# Patient Record
Sex: FEMALE | Race: BLACK OR AFRICAN AMERICAN | NOT HISPANIC OR LATINO | Employment: FULL TIME | URBAN - METROPOLITAN AREA
[De-identification: names, ages, dates, MRNs, and addresses within clinical notes are randomized per-mention and may not be internally consistent; named-entity substitution may affect disease eponyms.]

---

## 2017-11-15 ENCOUNTER — OFFICE VISIT (OUTPATIENT)
Dept: OBSTETRICS AND GYNECOLOGY | Facility: CLINIC | Age: 52
End: 2017-11-15
Payer: COMMERCIAL

## 2017-11-15 VITALS
BODY MASS INDEX: 29.55 KG/M2 | SYSTOLIC BLOOD PRESSURE: 116 MMHG | DIASTOLIC BLOOD PRESSURE: 82 MMHG | HEIGHT: 61 IN | WEIGHT: 156.5 LBS

## 2017-11-15 DIAGNOSIS — Z01.419 WELL WOMAN EXAM WITH ROUTINE GYNECOLOGICAL EXAM: Primary | ICD-10-CM

## 2017-11-15 DIAGNOSIS — Z12.39 BREAST CANCER SCREENING: ICD-10-CM

## 2017-11-15 DIAGNOSIS — N89.8 VAGINAL DISCHARGE: ICD-10-CM

## 2017-11-15 PROCEDURE — 99999 PR PBB SHADOW E&M-EST. PATIENT-LVL III: CPT | Mod: PBBFAC,,, | Performed by: OBSTETRICS & GYNECOLOGY

## 2017-11-15 PROCEDURE — 99396 PREV VISIT EST AGE 40-64: CPT | Mod: S$GLB,,, | Performed by: OBSTETRICS & GYNECOLOGY

## 2017-11-15 RX ORDER — FLUCONAZOLE 150 MG/1
150 TABLET ORAL
Qty: 3 TABLET | Refills: 1 | Status: SHIPPED | OUTPATIENT
Start: 2017-11-15 | End: 2018-11-02 | Stop reason: ALTCHOICE

## 2017-11-15 RX ORDER — METRONIDAZOLE 7.5 MG/G
1 GEL VAGINAL NIGHTLY
Qty: 5 APPLICATOR | Refills: 3 | Status: SHIPPED | OUTPATIENT
Start: 2017-11-15 | End: 2017-11-20

## 2017-11-15 NOTE — PROGRESS NOTES
"[facility]  Ambulatory Clinic  Obstetrics & Gynecology    Visit Date:  11/15/2017    Chief Complaint:  Annual GYN exam    History of Present Illness:      Cristina Fragoso is a 52 y.o.  here for a gynecologic exam with c/o vaginal discharge. Pt described discharge as clear, fishy, non bloody, without pelvic pain or abnormal vaginal bleeding for past few days. Menses are regular, not heavy or painful.  Pt current method of family planning is rhythm method/condoms, and reports no problems with this method.  Pt denies h/o abnormal pap, last pap ~. Pt has genital herpes, rare outbreaks. Pt denies h/o abnormal mammogram, last mammo ~2016.  Pt performs monthly self breast examination, former smoker, uses seat belts, and denies abuse. Pt denies any abnormal vaginal bleeding, dysmenorrhea, dyspareunia, pelvic pain, bloating, early satiety, unintentional weight loss, breast mass/skin changes, incontinence, GI or urinary complaints.      Past History:  Gynecologic history as noted above.    Review of Systems:      GENERAL:  No fever, fatigue, excessive weight gain or loss  HEENT:  No headaches, hearing changes, visual disturbance  RESPIRATORY:  No cough, shortness of breath  CARDIOVASCULAR:  No chest pain, heart palpitations, leg swelling  BREAST:  No lump, pain, nipple discharge, skin changes  GASTROINTESTINAL:  No nausea, vomiting, constipation, diarrhea, abd pain, rectal bleeding   GENITOURINARY:  See HPI  ENDOCRINE:  No heat or cold intolerance  HEMATOLOGIC:  No easy bruisability or bleeding   LYMPHATICS:  No enlarged nodes  MUSCULOSKELETAL:  No joint pain or swelling  SKIN:  No rash, lesions, jaundice  NEUROLOGIC:  No dizziness, weakness, syncope  PSYCHIATRIC:  Denies homicidal/suicidal ideations, anxiety or mood swings.    Physical Exam:     /82 (BP Location: Right arm, Patient Position: Sitting, BP Method: Large (Manual))   Ht 5' 1" (1.549 m)   Wt 71 kg (156 lb 8.4 oz)   " LMP 2017 (Approximate)   BMI 29.58 kg/m²      GENERAL:  No acute distress, well-nourished  HEENT:  Atraumatic, anicteric, moist mucus membranes, neck supple w/o masses  BREAST:  Symmetric, nontender, no obvious masses, adenopathy, skin changes or nipple discharge  LUNGS:  Clear to auscultation  HEART:  Regular rate and rhythm, no murmurs, gallops, or rubs  ABDOMEN:  Soft, non-tender, non-distended, normoactive bowel sounds, no obvious organomegaly  EXT:  Symmetric w/o cramping, claudication, or edema. +2 distal pulses  SKIN:  No rashes or bruising  PSYCH:  Mood and affect appropriate    GENITOURINARY:  NFEG no lesion. No vaginal or cervical lesion. No bleeding or discharge. No CMT. Uterus and ovaries small, NT. Wet prep negative. Declined rectal exam. No obvious external lesions.    Chaperone present for exam.    Assessment:     52 y.o. :    1. Well woman gynecologic exam  2. Bacterial vaginosis    Plan:    A gynecologic health assessment was performed with age appropriate counseling.  Cervical cancer screening - up to date.  STI screening - pt declined.  Safe sex discussed.    Order screening mammogram, pt advised to call and schedule.  Metrogel for bacterial vaginosis.  No alcohol while on antibx.  Hygiene advice.  Pt also requesting an Rx for diflucan for future use for yeast infection.   Encourage healthy lifestyle modifications, monthly self breast exams, Ca/Vit D.  F/u with PCP for health maintenance.  Return 1 year for GYN exam, or sooner as needed.    All questions answered, pt voiced understanding.        Edin Henson MD

## 2017-12-27 DIAGNOSIS — B37.31 VAGINAL YEAST INFECTION: ICD-10-CM

## 2017-12-27 RX ORDER — TERCONAZOLE 4 MG/G
CREAM VAGINAL
Qty: 45 G | Refills: 0 | Status: SHIPPED | OUTPATIENT
Start: 2017-12-27 | End: 2018-05-08 | Stop reason: SDUPTHER

## 2018-05-08 DIAGNOSIS — B37.31 VAGINAL YEAST INFECTION: ICD-10-CM

## 2018-05-08 RX ORDER — TERCONAZOLE 4 MG/G
CREAM VAGINAL
Qty: 45 G | Refills: 0 | Status: SHIPPED | OUTPATIENT
Start: 2018-05-08 | End: 2018-11-02 | Stop reason: ALTCHOICE

## 2018-11-02 ENCOUNTER — OFFICE VISIT (OUTPATIENT)
Dept: OBSTETRICS AND GYNECOLOGY | Facility: CLINIC | Age: 53
End: 2018-11-02
Payer: COMMERCIAL

## 2018-11-02 VITALS
BODY MASS INDEX: 29.45 KG/M2 | SYSTOLIC BLOOD PRESSURE: 120 MMHG | HEIGHT: 61 IN | WEIGHT: 156 LBS | DIASTOLIC BLOOD PRESSURE: 76 MMHG

## 2018-11-02 DIAGNOSIS — Z12.39 BREAST CANCER SCREENING: ICD-10-CM

## 2018-11-02 DIAGNOSIS — Z01.419 WELL WOMAN EXAM WITH ROUTINE GYNECOLOGICAL EXAM: Primary | ICD-10-CM

## 2018-11-02 PROCEDURE — 99999 PR PBB SHADOW E&M-EST. PATIENT-LVL III: CPT | Mod: PBBFAC,,, | Performed by: OBSTETRICS & GYNECOLOGY

## 2018-11-02 PROCEDURE — 99396 PREV VISIT EST AGE 40-64: CPT | Mod: S$GLB,,, | Performed by: OBSTETRICS & GYNECOLOGY

## 2018-11-02 NOTE — PROGRESS NOTES
[facility]  Ambulatory Clinic  Obstetrics & Gynecology    Visit Date:  2018    Chief Complaint:  Annual GYN exam    History of Present Illness:      Cristina Fragoso is a 53 y.o.  here for a gynecologic exam with c/o vaginal discharge.   Pt has no major complaints today.  Patient's last menstrual period was 2018.  Pt is perimenopausal, menses are spacing out, getting lighter with mild vasomotor sxs.  Pt current method of family planning is rhythm method/condoms, and reports no problems with this method.    Pt denies h/o abnormal pap, last pap ~.   Pt has genital herpes, rare outbreaks.   Pt denies h/o abnormal mammogram, last mammo ~2016.  Pt states she has not had colonoscopy.  Pt performs monthly self breast examination, former smoker, uses seat belts, and denies abuse.   Pt denies any abnormal vaginal bleeding, dysmenorrhea, dyspareunia, pelvic pain, bloating, early satiety, unintentional weight loss, breast mass/skin changes, incontinence, GI or urinary complaints.    Pt states she in her usual state of health and has good f/u with her PCP.    Past History:  Gynecologic history as noted above.    Review of Systems:      GENERAL:  No fever, fatigue, excessive weight gain or loss  HEENT:  No headaches, hearing changes, visual disturbance  RESPIRATORY:  No cough, shortness of breath  CARDIOVASCULAR:  No chest pain, heart palpitations, leg swelling  BREAST:  No lump, pain, nipple discharge, skin changes  GASTROINTESTINAL:  No nausea, vomiting, constipation, diarrhea, abd pain, rectal bleeding   GENITOURINARY:  See HPI  ENDOCRINE:  No heat or cold intolerance  HEMATOLOGIC:  No easy bruisability or bleeding   LYMPHATICS:  No enlarged nodes  MUSCULOSKELETAL:  No joint pain or swelling  SKIN:  No rash, lesions, jaundice  NEUROLOGIC:  No dizziness, weakness, syncope  PSYCHIATRIC:  Denies homicidal/suicidal ideations, anxiety or mood swings.    Physical Exam:     /76  "(BP Location: Right arm, Patient Position: Sitting, BP Method: Large (Manual))   Ht 5' 1" (1.549 m)   Wt 70.8 kg (155 lb 15.6 oz)   BMI 29.47 kg/m²      GENERAL:  No acute distress, well-nourished  HEENT:  Atraumatic, anicteric, moist mucus membranes, neck supple w/o masses  BREAST:  Symmetric, nontender, no obvious masses, adenopathy, skin changes or nipple discharge  LUNGS:  Clear to auscultation  HEART:  Regular rate and rhythm, no murmurs, gallops, or rubs  ABDOMEN:  Soft, non-tender, non-distended, normoactive bowel sounds, no obvious organomegaly  EXT:  Symmetric w/o cramping, claudication, or edema. +2 distal pulses  SKIN:  No rashes or bruising  PSYCH:  Mood and affect appropriate  GENITOURINARY:  NFEG no lesion. No vaginal or cervical lesion. No bleeding or discharge. No CMT. Uterus and ovaries small, NT. Declined rectal exam. No obvious external lesions.    Chaperone present for exam.    Assessment:     53 y.o. :    1. Well woman gynecologic exam  2. Perimenopause    Plan:    A gynecologic health assessment was performed with age appropriate counseling.    Cervical cancer screening - up to date.     Order screening mammogram, pt advised to call and schedule.    We discussed the physiologic changes associated with menopause including various treatment options.  We discussed non-estrogen, alternative therapies, and lifestyle modifications for menopausal symptoms.      Encourage healthy lifestyle modifications, monthly self breast exams, Ca/Vit D.    F/u with PCP for health maintenance.  Pt encourage to get colonoscopy, advised to call her PCP for referral.    Return 1 year for GYN exam, or sooner as needed.  Pt voiced understanding.        Edin Henson MD            "

## 2018-12-03 ENCOUNTER — HOSPITAL ENCOUNTER (OUTPATIENT)
Dept: RADIOLOGY | Facility: OTHER | Age: 53
Discharge: HOME | End: 2018-12-03
Attending: OBSTETRICS & GYNECOLOGY
Payer: COMMERCIAL

## 2018-12-03 DIAGNOSIS — Z12.39 BREAST CANCER SCREENING: ICD-10-CM

## 2018-12-03 PROCEDURE — 77063 BREAST TOMOSYNTHESIS BI: CPT | Mod: TC

## 2018-12-03 PROCEDURE — 77067 SCR MAMMO BI INCL CAD: CPT | Mod: 26,,, | Performed by: RADIOLOGY

## 2018-12-03 PROCEDURE — 77063 BREAST TOMOSYNTHESIS BI: CPT | Mod: 26,,, | Performed by: RADIOLOGY

## 2020-04-21 DIAGNOSIS — Z01.84 ANTIBODY RESPONSE EXAMINATION: ICD-10-CM

## 2020-05-21 DIAGNOSIS — Z01.84 ANTIBODY RESPONSE EXAMINATION: ICD-10-CM

## 2020-06-20 DIAGNOSIS — Z01.84 ANTIBODY RESPONSE EXAMINATION: ICD-10-CM

## 2020-07-20 DIAGNOSIS — Z01.84 ANTIBODY RESPONSE EXAMINATION: ICD-10-CM

## 2020-08-19 DIAGNOSIS — Z01.84 ANTIBODY RESPONSE EXAMINATION: ICD-10-CM

## 2020-09-03 ENCOUNTER — HOSPITAL ENCOUNTER (OUTPATIENT)
Dept: RADIOLOGY | Facility: HOSPITAL | Age: 55
Discharge: HOME | End: 2020-09-03
Attending: NURSE PRACTITIONER
Payer: COMMERCIAL

## 2020-09-03 DIAGNOSIS — Z12.31 BREAST CANCER SCREENING BY MAMMOGRAM: ICD-10-CM

## 2020-09-03 PROCEDURE — 77063 MAMMO DIGITAL SCREENING BILAT WITH TOMOSYNTHESIS_CAD: ICD-10-PCS | Mod: 26,,, | Performed by: RADIOLOGY

## 2020-09-03 PROCEDURE — 77063 BREAST TOMOSYNTHESIS BI: CPT | Mod: 26,,, | Performed by: RADIOLOGY

## 2020-09-03 PROCEDURE — 77067 SCR MAMMO BI INCL CAD: CPT | Mod: TC

## 2020-09-03 PROCEDURE — 77067 MAMMO DIGITAL SCREENING BILAT WITH TOMOSYNTHESIS_CAD: ICD-10-PCS | Mod: 26,,, | Performed by: RADIOLOGY

## 2020-09-03 PROCEDURE — 77067 SCR MAMMO BI INCL CAD: CPT | Mod: 26,,, | Performed by: RADIOLOGY

## 2020-09-18 DIAGNOSIS — Z01.84 ANTIBODY RESPONSE EXAMINATION: ICD-10-CM

## 2020-10-18 DIAGNOSIS — Z01.84 ANTIBODY RESPONSE EXAMINATION: ICD-10-CM

## 2020-11-17 DIAGNOSIS — Z01.84 ANTIBODY RESPONSE EXAMINATION: ICD-10-CM

## 2021-04-16 ENCOUNTER — PATIENT MESSAGE (OUTPATIENT)
Dept: RESEARCH | Facility: HOSPITAL | Age: 56
End: 2021-04-16

## 2021-05-28 ENCOUNTER — OFFICE VISIT (OUTPATIENT)
Dept: OBSTETRICS AND GYNECOLOGY | Facility: CLINIC | Age: 56
End: 2021-05-28
Payer: COMMERCIAL

## 2021-05-28 VITALS
WEIGHT: 156.94 LBS | DIASTOLIC BLOOD PRESSURE: 84 MMHG | BODY MASS INDEX: 29.63 KG/M2 | HEIGHT: 61 IN | SYSTOLIC BLOOD PRESSURE: 122 MMHG

## 2021-05-28 DIAGNOSIS — Z01.419 WELL WOMAN EXAM WITH ROUTINE GYNECOLOGICAL EXAM: Primary | ICD-10-CM

## 2021-05-28 DIAGNOSIS — Z12.4 CERVICAL CANCER SCREENING: ICD-10-CM

## 2021-05-28 PROCEDURE — 99999 PR PBB SHADOW E&M-EST. PATIENT-LVL III: ICD-10-PCS | Mod: PBBFAC,,, | Performed by: OBSTETRICS & GYNECOLOGY

## 2021-05-28 PROCEDURE — 1126F PR PAIN SEVERITY QUANTIFIED, NO PAIN PRESENT: ICD-10-PCS | Mod: S$GLB,,, | Performed by: OBSTETRICS & GYNECOLOGY

## 2021-05-28 PROCEDURE — 99396 PREV VISIT EST AGE 40-64: CPT | Mod: S$GLB,,, | Performed by: OBSTETRICS & GYNECOLOGY

## 2021-05-28 PROCEDURE — 3008F BODY MASS INDEX DOCD: CPT | Mod: CPTII,S$GLB,, | Performed by: OBSTETRICS & GYNECOLOGY

## 2021-05-28 PROCEDURE — 1126F AMNT PAIN NOTED NONE PRSNT: CPT | Mod: S$GLB,,, | Performed by: OBSTETRICS & GYNECOLOGY

## 2021-05-28 PROCEDURE — 99396 PR PREVENTIVE VISIT,EST,40-64: ICD-10-PCS | Mod: S$GLB,,, | Performed by: OBSTETRICS & GYNECOLOGY

## 2021-05-28 PROCEDURE — 3008F PR BODY MASS INDEX (BMI) DOCUMENTED: ICD-10-PCS | Mod: CPTII,S$GLB,, | Performed by: OBSTETRICS & GYNECOLOGY

## 2021-05-28 PROCEDURE — 88175 CYTOPATH C/V AUTO FLUID REDO: CPT | Performed by: OBSTETRICS & GYNECOLOGY

## 2021-05-28 PROCEDURE — 87624 HPV HI-RISK TYP POOLED RSLT: CPT | Performed by: OBSTETRICS & GYNECOLOGY

## 2021-05-28 PROCEDURE — 99999 PR PBB SHADOW E&M-EST. PATIENT-LVL III: CPT | Mod: PBBFAC,,, | Performed by: OBSTETRICS & GYNECOLOGY

## 2021-05-28 RX ORDER — ROSUVASTATIN CALCIUM 10 MG/1
10 TABLET, COATED ORAL DAILY
COMMUNITY
Start: 2021-04-15

## 2021-06-02 LAB
HPV HR 12 DNA SPEC QL NAA+PROBE: NEGATIVE
HPV16 AG SPEC QL: NEGATIVE
HPV18 DNA SPEC QL NAA+PROBE: NEGATIVE

## 2021-06-08 LAB
FINAL PATHOLOGIC DIAGNOSIS: NORMAL
Lab: NORMAL

## 2021-07-01 ENCOUNTER — PATIENT MESSAGE (OUTPATIENT)
Dept: ADMINISTRATIVE | Facility: OTHER | Age: 56
End: 2021-07-01

## 2021-10-23 ENCOUNTER — HOSPITAL ENCOUNTER (OUTPATIENT)
Dept: RADIOLOGY | Facility: OTHER | Age: 56
Discharge: HOME | End: 2021-10-23
Attending: NURSE PRACTITIONER
Payer: COMMERCIAL

## 2021-10-23 DIAGNOSIS — R74.8 ELEVATED LIVER ENZYMES: ICD-10-CM

## 2021-10-23 DIAGNOSIS — R10.11 RUQ ABDOMINAL PAIN: ICD-10-CM

## 2021-10-23 PROCEDURE — 76700 US EXAM ABDOM COMPLETE: CPT | Mod: TC

## 2021-10-23 PROCEDURE — 76700 US EXAM ABDOM COMPLETE: CPT | Mod: 26,,, | Performed by: RADIOLOGY

## 2021-10-23 PROCEDURE — 76700 US ABDOMEN COMPLETE: ICD-10-PCS | Mod: 26,,, | Performed by: RADIOLOGY

## 2021-11-04 ENCOUNTER — LAB VISIT (OUTPATIENT)
Dept: LAB | Facility: OTHER | Age: 56
End: 2021-11-04
Attending: INTERNAL MEDICINE
Payer: COMMERCIAL

## 2021-11-04 DIAGNOSIS — R79.89 ABNORMAL LIVER FUNCTION TESTS: ICD-10-CM

## 2021-11-04 DIAGNOSIS — K83.8 COMMON BILE DUCT DILATATION: ICD-10-CM

## 2021-11-04 DIAGNOSIS — Z12.11 SPECIAL SCREENING FOR MALIGNANT NEOPLASMS, COLON: Primary | ICD-10-CM

## 2021-11-04 DIAGNOSIS — R10.11 ABDOMINAL PAIN, RIGHT UPPER QUADRANT: ICD-10-CM

## 2021-11-04 LAB
FERRITIN SERPL-MCNC: 152 NG/ML (ref 20–300)
IRON SERPL-MCNC: 83 UG/DL (ref 30–160)
SATURATED IRON: 22 % (ref 20–50)
TOTAL IRON BINDING CAPACITY: 382 UG/DL (ref 250–450)
TRANSFERRIN SERPL-MCNC: 258 MG/DL (ref 200–375)

## 2021-11-04 PROCEDURE — 86039 ANTINUCLEAR ANTIBODIES (ANA): CPT | Performed by: INTERNAL MEDICINE

## 2021-11-04 PROCEDURE — 82728 ASSAY OF FERRITIN: CPT | Performed by: INTERNAL MEDICINE

## 2021-11-04 PROCEDURE — 86235 NUCLEAR ANTIGEN ANTIBODY: CPT | Mod: 59 | Performed by: INTERNAL MEDICINE

## 2021-11-04 PROCEDURE — 86038 ANTINUCLEAR ANTIBODIES: CPT | Performed by: INTERNAL MEDICINE

## 2021-11-04 PROCEDURE — 36415 COLL VENOUS BLD VENIPUNCTURE: CPT | Performed by: INTERNAL MEDICINE

## 2021-11-04 PROCEDURE — 80074 ACUTE HEPATITIS PANEL: CPT | Performed by: INTERNAL MEDICINE

## 2021-11-04 PROCEDURE — 86256 FLUORESCENT ANTIBODY TITER: CPT | Performed by: INTERNAL MEDICINE

## 2021-11-04 PROCEDURE — 84466 ASSAY OF TRANSFERRIN: CPT | Performed by: INTERNAL MEDICINE

## 2021-11-05 LAB
ANA PATTERN 1: NORMAL
ANA SER QL IF: POSITIVE
ANA TITR SER IF: NORMAL {TITER}
HAV IGM SERPL QL IA: NEGATIVE
HBV CORE IGM SERPL QL IA: NEGATIVE
HBV SURFACE AG SERPL QL IA: NEGATIVE
HCV AB SERPL QL IA: NEGATIVE

## 2021-11-10 LAB — SMOOTH MUSCLE AB TITR SER IF: ABNORMAL {TITER}

## 2021-11-11 LAB
ANTI SM ANTIBODY: 0.06 RATIO (ref 0–0.99)
ANTI SM/RNP ANTIBODY: 0.06 RATIO (ref 0–0.99)
ANTI-SM INTERPRETATION: NEGATIVE
ANTI-SM/RNP INTERPRETATION: NEGATIVE
ANTI-SSA ANTIBODY: 0.06 RATIO (ref 0–0.99)
ANTI-SSA INTERPRETATION: NEGATIVE
ANTI-SSB ANTIBODY: 0.06 RATIO (ref 0–0.99)
ANTI-SSB INTERPRETATION: NEGATIVE
DSDNA AB SER-ACNC: NORMAL [IU]/ML

## 2022-06-07 ENCOUNTER — HOSPITAL ENCOUNTER (OUTPATIENT)
Dept: RADIOLOGY | Facility: OTHER | Age: 57
Discharge: HOME | End: 2022-06-07
Attending: NURSE PRACTITIONER
Payer: COMMERCIAL

## 2022-06-07 DIAGNOSIS — Z12.31 ENCOUNTER FOR SCREENING MAMMOGRAM FOR MALIGNANT NEOPLASM OF BREAST: ICD-10-CM

## 2022-06-07 PROCEDURE — 77067 SCR MAMMO BI INCL CAD: CPT | Mod: TC

## 2022-06-07 PROCEDURE — 77063 BREAST TOMOSYNTHESIS BI: CPT | Mod: 26,,, | Performed by: RADIOLOGY

## 2022-06-07 PROCEDURE — 77067 MAMMO DIGITAL SCREENING BILAT WITH TOMO: ICD-10-PCS | Mod: 26,,, | Performed by: RADIOLOGY

## 2022-06-07 PROCEDURE — 77067 SCR MAMMO BI INCL CAD: CPT | Mod: 26,,, | Performed by: RADIOLOGY

## 2022-06-07 PROCEDURE — 77063 MAMMO DIGITAL SCREENING BILAT WITH TOMO: ICD-10-PCS | Mod: 26,,, | Performed by: RADIOLOGY

## 2022-06-07 PROCEDURE — 77063 BREAST TOMOSYNTHESIS BI: CPT | Mod: TC

## 2022-11-02 ENCOUNTER — OFFICE VISIT (OUTPATIENT)
Dept: OBSTETRICS AND GYNECOLOGY | Facility: CLINIC | Age: 57
End: 2022-11-02
Payer: COMMERCIAL

## 2022-11-02 VITALS
DIASTOLIC BLOOD PRESSURE: 70 MMHG | HEIGHT: 61 IN | SYSTOLIC BLOOD PRESSURE: 110 MMHG | BODY MASS INDEX: 30.69 KG/M2 | WEIGHT: 162.56 LBS

## 2022-11-02 DIAGNOSIS — Z01.419 WELL WOMAN EXAM WITH ROUTINE GYNECOLOGICAL EXAM: Primary | ICD-10-CM

## 2022-11-02 PROCEDURE — 3078F DIAST BP <80 MM HG: CPT | Mod: CPTII,S$GLB,, | Performed by: OBSTETRICS & GYNECOLOGY

## 2022-11-02 PROCEDURE — 3074F PR MOST RECENT SYSTOLIC BLOOD PRESSURE < 130 MM HG: ICD-10-PCS | Mod: CPTII,S$GLB,, | Performed by: OBSTETRICS & GYNECOLOGY

## 2022-11-02 PROCEDURE — 99396 PREV VISIT EST AGE 40-64: CPT | Mod: S$GLB,,, | Performed by: OBSTETRICS & GYNECOLOGY

## 2022-11-02 PROCEDURE — 3074F SYST BP LT 130 MM HG: CPT | Mod: CPTII,S$GLB,, | Performed by: OBSTETRICS & GYNECOLOGY

## 2022-11-02 PROCEDURE — 99396 PR PREVENTIVE VISIT,EST,40-64: ICD-10-PCS | Mod: S$GLB,,, | Performed by: OBSTETRICS & GYNECOLOGY

## 2022-11-02 PROCEDURE — 99999 PR PBB SHADOW E&M-EST. PATIENT-LVL III: CPT | Mod: PBBFAC,,, | Performed by: OBSTETRICS & GYNECOLOGY

## 2022-11-02 PROCEDURE — 1159F PR MEDICATION LIST DOCUMENTED IN MEDICAL RECORD: ICD-10-PCS | Mod: CPTII,S$GLB,, | Performed by: OBSTETRICS & GYNECOLOGY

## 2022-11-02 PROCEDURE — 3078F PR MOST RECENT DIASTOLIC BLOOD PRESSURE < 80 MM HG: ICD-10-PCS | Mod: CPTII,S$GLB,, | Performed by: OBSTETRICS & GYNECOLOGY

## 2022-11-02 PROCEDURE — 1159F MED LIST DOCD IN RCRD: CPT | Mod: CPTII,S$GLB,, | Performed by: OBSTETRICS & GYNECOLOGY

## 2022-11-02 PROCEDURE — 99999 PR PBB SHADOW E&M-EST. PATIENT-LVL III: ICD-10-PCS | Mod: PBBFAC,,, | Performed by: OBSTETRICS & GYNECOLOGY

## 2022-11-02 PROCEDURE — 3008F PR BODY MASS INDEX (BMI) DOCUMENTED: ICD-10-PCS | Mod: CPTII,S$GLB,, | Performed by: OBSTETRICS & GYNECOLOGY

## 2022-11-02 PROCEDURE — 3008F BODY MASS INDEX DOCD: CPT | Mod: CPTII,S$GLB,, | Performed by: OBSTETRICS & GYNECOLOGY

## 2022-11-02 NOTE — PROGRESS NOTES
"[facility]  Ambulatory Clinic  Obstetrics & Gynecology    Visit Date:  2022    Chief Complaint:  Annual GYN exam    History of Present Illness:      Cristina Fragoso is a 57 y.o.  here for a gynecologic exam.    Pt has no major complaints today.    Pt reports an uneventful transition into menopause and is not on hormone replacement therapy.    Last pap ~2021 benign.  Last mammo ~2022 years ago per pt.  Pt performs monthly self breast examination, non-smoker, uses seat belts, and denies abuse.   Pt denies vaginal bleeding, vaginal discharge, dyspareunia, pelvic pain, bloating, early satiety, unintentional weight loss, breast mass/skin changes, incontinence, GI or urinary complaints.    Otherwise, the pt is in her usual state of health.    Past History:  Gynecologic history as noted above.    Review of Systems:      GENERAL:  No fever, fatigue, excessive weight gain or loss  HEENT:  No headaches, hearing changes, visual disturbance  RESPIRATORY:  No cough, shortness of breath  CARDIOVASCULAR:  No chest pain, heart palpitations, leg swelling  BREAST:  No lump, pain, nipple discharge, skin changes  GASTROINTESTINAL:  No nausea, vomiting, constipation, diarrhea, abd pain, rectal bleeding   GENITOURINARY:  See HPI  ENDOCRINE:  No heat or cold intolerance  HEMATOLOGIC:  No easy bruisability or bleeding   LYMPHATICS:  No enlarged nodes  MUSCULOSKELETAL:  No acute joint pain or swelling  SKIN:  No rash, lesions, jaundice  NEUROLOGIC:  No dizziness, weakness, syncope  PSYCHIATRIC:  No significant mood changes, homicidal/suicidal ideations    Physical Exam:     /70   Ht 5' 1" (1.549 m)   Wt 73.8 kg (162 lb 9.4 oz)   BMI 30.72 kg/m²   Pulse 60's, Resp rate 16     GENERAL:  No acute distress, well-nourished  HEENT:  Atraumatic, anicteric, moist mucus membranes. Neck supple w/o masses.  BREAST:  Symmetric, nontender, no obvious masses, adenopathy, skin changes or nipple " discharge.  LUNGS:  Clear normal respiratory effort  HEART:  Regular rate and rhythm, no murmurs, gallops, or rubs  ABDOMEN:  Soft, non-tender, non-distended, normoactive bowel sounds, no obvious organomegaly  EXT:  Symmetric w/o cramping, claudication, or edema. +2 distal pulses.  SKIN:  No rashes or bruising  PSYCH:  Mood and affect appropriate  NEURO:  Grossly intact bilaterally    GENITOURINARY:    VULVAR:  Female external genitalia w/o obvious lesions. Normal urethral meatus. No gross lymphadenopathy.   VAGINA:  Mild, age appropriate vulvovaginal atrophy. No significant cystocele or rectocele. No obvious lesion. No discharge.  CERVIX:  No cervical motion tenderness, discharge, or obvious lesions.   UTERUS:  Small, non-tender, normal contour  ADNEXA:  No masses, non-tender    RECTAL:  Deferred. No obvious external lesions    Chaperone present for exam.    Assessment:     57 y.o. :    Well woman gynecologic exam    Plan:    A gynecologic health assessment was performed with age appropriate counseling.  Cervical cancer screening - pap up to date.  Screening mammogram up to date.  Encourage healthy lifestyle modifications, monthly self breast exams, Ca/Vit D, rec'd COVID vaccines, postmenopausal bleeding precautions, and colonoscopy.  F/u with PCP for health maintenance.  Return 1 year for gynecologic exam, or sooner as needed.    All questions answered, pt voiced understanding.        Edin Henson MD

## 2023-03-03 ENCOUNTER — OFFICE VISIT (OUTPATIENT)
Dept: OBSTETRICS AND GYNECOLOGY | Facility: CLINIC | Age: 58
End: 2023-03-03
Payer: COMMERCIAL

## 2023-03-03 VITALS
SYSTOLIC BLOOD PRESSURE: 112 MMHG | BODY MASS INDEX: 31.39 KG/M2 | HEIGHT: 61 IN | WEIGHT: 166.25 LBS | DIASTOLIC BLOOD PRESSURE: 74 MMHG

## 2023-03-03 DIAGNOSIS — N64.4 BREAST PAIN, LEFT: Primary | ICD-10-CM

## 2023-03-03 PROCEDURE — 99213 PR OFFICE/OUTPT VISIT, EST, LEVL III, 20-29 MIN: ICD-10-PCS | Mod: S$GLB,,, | Performed by: OBSTETRICS & GYNECOLOGY

## 2023-03-03 PROCEDURE — 3074F SYST BP LT 130 MM HG: CPT | Mod: CPTII,S$GLB,, | Performed by: OBSTETRICS & GYNECOLOGY

## 2023-03-03 PROCEDURE — 99999 PR PBB SHADOW E&M-EST. PATIENT-LVL III: ICD-10-PCS | Mod: PBBFAC,,, | Performed by: OBSTETRICS & GYNECOLOGY

## 2023-03-03 PROCEDURE — 3078F DIAST BP <80 MM HG: CPT | Mod: CPTII,S$GLB,, | Performed by: OBSTETRICS & GYNECOLOGY

## 2023-03-03 PROCEDURE — 3008F PR BODY MASS INDEX (BMI) DOCUMENTED: ICD-10-PCS | Mod: CPTII,S$GLB,, | Performed by: OBSTETRICS & GYNECOLOGY

## 2023-03-03 PROCEDURE — 1159F PR MEDICATION LIST DOCUMENTED IN MEDICAL RECORD: ICD-10-PCS | Mod: CPTII,S$GLB,, | Performed by: OBSTETRICS & GYNECOLOGY

## 2023-03-03 PROCEDURE — 99999 PR PBB SHADOW E&M-EST. PATIENT-LVL III: CPT | Mod: PBBFAC,,, | Performed by: OBSTETRICS & GYNECOLOGY

## 2023-03-03 PROCEDURE — 3074F PR MOST RECENT SYSTOLIC BLOOD PRESSURE < 130 MM HG: ICD-10-PCS | Mod: CPTII,S$GLB,, | Performed by: OBSTETRICS & GYNECOLOGY

## 2023-03-03 PROCEDURE — 3078F PR MOST RECENT DIASTOLIC BLOOD PRESSURE < 80 MM HG: ICD-10-PCS | Mod: CPTII,S$GLB,, | Performed by: OBSTETRICS & GYNECOLOGY

## 2023-03-03 PROCEDURE — 99213 OFFICE O/P EST LOW 20 MIN: CPT | Mod: S$GLB,,, | Performed by: OBSTETRICS & GYNECOLOGY

## 2023-03-03 PROCEDURE — 1159F MED LIST DOCD IN RCRD: CPT | Mod: CPTII,S$GLB,, | Performed by: OBSTETRICS & GYNECOLOGY

## 2023-03-03 PROCEDURE — 3008F BODY MASS INDEX DOCD: CPT | Mod: CPTII,S$GLB,, | Performed by: OBSTETRICS & GYNECOLOGY

## 2023-03-03 NOTE — PROGRESS NOTES
"[facility]  Ambulatory Clinic  Obstetrics & Gynecology    Visit Date:  3/3/2023    Chief Complaint:  Left breast pain    History of Present Illness:      Cristina Fragoso is a 57 y.o.  here with c/o left breast pain     Pain is dull and crampy, episodic in nature, no particular aggravating factors, and relieved with rest for past few days.    On exam, no breast mass/skin changes or nipple discharge noted.    Mammogram 2022 was benign.    Pt reports moderate coffee use, few cups a day.    Pt denies SOB or CP.    Pt denies vaginal bleeding, vaginal discharge, dyspareunia, pelvic pain, bloating, early satiety, unintentional weight loss, GI or urinary complaints.      Review of Systems:      GENERAL:  No fever, fatigue, excessive weight gain or loss  HEENT:  No headaches, hearing changes, visual disturbance  RESPIRATORY:  No cough, shortness of breath  CARDIOVASCULAR:  No chest pain, heart palpitations, leg swelling  GASTROINTESTINAL:  No nausea, vomiting, constipation, diarrhea, abd pain, rectal bleeding   GENITOURINARY:  See HPI    Physical Exam:     /74   Ht 5' 1" (1.549 m)   Wt 75.4 kg (166 lb 3.6 oz)   BMI 31.41 kg/m²   Pulse 50's, Resp rate 14     GENERAL:  No acute distress, well-nourished  HEENT:  Atraumatic, anicteric, moist mucus membranes  BREAST:  Symmetric, nontender, no obvious masses, adenopathy, skin changes or nipple discharge.  LUNGS:  Clear normal respiratory effort  HEART:  Regular rate and rhythm, no murmurs, gallops, or rubs  ABDOMEN:  Soft, non-tender, non-distended, normoactive bowel sounds, no obvious organomegaly  PSYCH:  Mood and affect appropriate  NEURO:  Grossly intact bilaterally    Chaperone present for exam.    Assessment:     57 y.o. :    Left breast pain    Plan:    Discussed breast pain.  No obvious abnormalities noted today's breast exam.  Suspect breast pain secondary to mastalgia.  Discussed supportive care measures.  Supportive " bra.  Offer diagnostic mammogram of left breast if no improvement, pt will call office if no improvement.  NSAIDs prn.  Monthly self breast exams.  Screening mammogram due 6/2023.    Encourage healthy lifestyle modifications.    F/u with PCP for health maintenance.    Return 11/2023 for gynecologic exam, or sooner as needed.  All questions answered, pt voiced understanding.        Edin Henson MD

## 2023-07-19 ENCOUNTER — HOSPITAL ENCOUNTER (OUTPATIENT)
Dept: RADIOLOGY | Facility: OTHER | Age: 58
Discharge: HOME | End: 2023-07-19
Attending: NURSE PRACTITIONER
Payer: COMMERCIAL

## 2023-07-19 DIAGNOSIS — Z12.31 ENCOUNTER FOR SCREENING MAMMOGRAM FOR MALIGNANT NEOPLASM OF BREAST: ICD-10-CM

## 2023-07-19 PROCEDURE — 77063 BREAST TOMOSYNTHESIS BI: CPT | Mod: 26,,, | Performed by: RADIOLOGY

## 2023-07-19 PROCEDURE — 77067 MAMMO DIGITAL SCREENING BILAT WITH TOMO: ICD-10-PCS | Mod: 26,,, | Performed by: RADIOLOGY

## 2023-07-19 PROCEDURE — 77067 SCR MAMMO BI INCL CAD: CPT | Mod: 26,,, | Performed by: RADIOLOGY

## 2023-07-19 PROCEDURE — 77063 MAMMO DIGITAL SCREENING BILAT WITH TOMO: ICD-10-PCS | Mod: 26,,, | Performed by: RADIOLOGY

## 2023-07-19 PROCEDURE — 77067 SCR MAMMO BI INCL CAD: CPT | Mod: TC

## 2023-10-09 ENCOUNTER — HOSPITAL ENCOUNTER (OUTPATIENT)
Dept: RADIOLOGY | Facility: OTHER | Age: 58
Discharge: HOME | End: 2023-10-09
Attending: NURSE PRACTITIONER
Payer: COMMERCIAL

## 2023-10-09 DIAGNOSIS — I83.93 VARICOSE VEINS OF BOTH LOWER EXTREMITIES, UNSPECIFIED WHETHER COMPLICATED: ICD-10-CM

## 2023-10-09 DIAGNOSIS — G62.9 NEUROPATHY: ICD-10-CM

## 2023-10-09 DIAGNOSIS — M79.604 RIGHT LEG PAIN: ICD-10-CM

## 2023-10-09 PROCEDURE — 93970 US LOWER EXTREMITY VEINS BILATERAL: ICD-10-PCS | Mod: 26,,, | Performed by: RADIOLOGY

## 2023-10-09 PROCEDURE — 93970 EXTREMITY STUDY: CPT | Mod: 26,,, | Performed by: RADIOLOGY

## 2023-10-09 PROCEDURE — 93970 EXTREMITY STUDY: CPT | Mod: TC

## 2024-07-29 ENCOUNTER — HOSPITAL ENCOUNTER (OUTPATIENT)
Dept: RADIOLOGY | Facility: OTHER | Age: 59
Discharge: HOME | End: 2024-07-29
Attending: NURSE PRACTITIONER
Payer: COMMERCIAL

## 2024-07-29 DIAGNOSIS — Z12.31 BREAST CANCER SCREENING BY MAMMOGRAM: ICD-10-CM

## 2024-07-29 PROCEDURE — 77063 BREAST TOMOSYNTHESIS BI: CPT | Mod: TC

## 2025-03-12 DIAGNOSIS — S92.912A TOE FRACTURE, LEFT: Primary | ICD-10-CM

## 2025-03-18 ENCOUNTER — OFFICE VISIT (OUTPATIENT)
Dept: ORTHOPEDICS | Facility: CLINIC | Age: 60
End: 2025-03-18
Payer: COMMERCIAL

## 2025-03-18 ENCOUNTER — APPOINTMENT (OUTPATIENT)
Dept: RADIOLOGY | Facility: HOSPITAL | Age: 60
End: 2025-03-18
Payer: COMMERCIAL

## 2025-03-18 VITALS — HEIGHT: 61 IN | BODY MASS INDEX: 31.39 KG/M2 | WEIGHT: 166.25 LBS

## 2025-03-18 DIAGNOSIS — S92.912A TOE FRACTURE, LEFT: ICD-10-CM

## 2025-03-18 DIAGNOSIS — S92.425A CLOSED NONDISPLACED FRACTURE OF DISTAL PHALANX OF LEFT GREAT TOE, INITIAL ENCOUNTER: Primary | ICD-10-CM

## 2025-03-18 PROCEDURE — 99203 OFFICE O/P NEW LOW 30 MIN: CPT | Mod: S$GLB,,,

## 2025-03-18 PROCEDURE — 1160F RVW MEDS BY RX/DR IN RCRD: CPT | Mod: CPTII,S$GLB,,

## 2025-03-18 PROCEDURE — 73630 X-RAY EXAM OF FOOT: CPT | Mod: 26,LT,, | Performed by: INTERNAL MEDICINE

## 2025-03-18 PROCEDURE — 73630 X-RAY EXAM OF FOOT: CPT | Mod: TC,FY,PN,LT

## 2025-03-18 PROCEDURE — 99999 PR PBB SHADOW E&M-EST. PATIENT-LVL III: CPT | Mod: PBBFAC,,,

## 2025-03-18 PROCEDURE — 3008F BODY MASS INDEX DOCD: CPT | Mod: CPTII,S$GLB,,

## 2025-03-18 PROCEDURE — 1159F MED LIST DOCD IN RCRD: CPT | Mod: CPTII,S$GLB,,

## 2025-03-18 NOTE — PROGRESS NOTES
New Orthopedic Patient: Foot/Ankle Pain    Initial Visit 3/18/25 HISTORY OF PRESENT ILLNESS   Cristina Fragoso, a 59 y.o. female, presents today for evaluation of her left  foot . Patient reports onset of acute pain beginning Date: 3/3/25. Patient reports injury/trauma. She dropped an empty pot from a high cabinet directly onto left foot. She fell backwards and braced her fall on her right hand. Immediately had pain with weight-bearing and swelling most prominent of left great toe. She attempted initial treatment with tylenol and buddy taped great and second toe. She was seen at [facility] the following day for right hand and left foot pain. Right hand radiographs obtained did not show evidence of acute fracture or dislocation. Left foot radiographs demonstrated acute nondisplaced fracture through the shaft of the distal phalanx of the great toe, without intra-articular extension. She was recommended to continue buddy taping and prescribed tramadol. Since then, her pain and swelling to the left great toe has improved significantly. She is ambulating in normal shoe without issue. She has continued to buddy tape her great and second toe. She is taking tylenol and ibuprofen as needed. Pain is rated as 4/10 today with provacative activity including weight-bearing.  Symptoms do not interfere with sleep. Patient reports pain & symptoms are getting better . Patient reports no prior surgery to  foot or ankle .     Past Medical History:   Diagnosis Date    Depression     Herpes simplex without mention of complication     Hyperlipidemia      Past Surgical History:   Procedure Laterality Date    APPENDECTOMY      BUNIONECTOMY  2004    right foot     SECTION  1999    x1    DILATION AND CURETTAGE OF UTERUS       Family History   Problem Relation Name Age of Onset    Kidney disease Neg Hx      Breast cancer Neg Hx      Cancer Neg Hx      Colon cancer Neg Hx      Diabetes Neg Hx      Eclampsia Neg Hx       Ovarian cancer Neg Hx      Miscarriages / Stillbirths Neg Hx      Hypertension Neg Hx       labor Neg Hx      Stroke Neg Hx      Blindness Neg Hx      Glaucoma Neg Hx      Macular degeneration Neg Hx      Retinal detachment Neg Hx           Review of systems (ROS):  PAIN ASSESSMENT:  See HPI.  MUSCULOSKELETAL: See HPI.  OTHER 10 point review of systems is negative except as stated in HPI above      PHYSICAL EXAMINATION  General:  The patient is alert and oriented x 3. Mood is pleasant. Observation of ears, eyes and nose reveal no gross abnormalities. HEENT: NCAT, sclera anicteric. Lungs: Respirations are equal and unlabored.  Gait is coordinated. Patient can toe walk and heel walk without difficulty.     LEFT FOOT/ANKLE:        Observation/Inspection:    No evidence of visible deformity. There is bruising and mild swelling of the dorsal aspect of left foot at base of second and great toe.  Normal gait. No difficulty with heel walking.   Hindfoot and forefoot alignment appears normal.         Palpation:   Tenderness to palpation to left great toe and base of second toe.   Otherwise, negative tenderness to palpation to bony prominences and soft tissues throughout.        ROM (active and passive):  Ankle Dorsiflexion:   15°  Ankle Plantarflexion:   45°                             Eversion:   15°  Inversion:   25°                             Able to move all toes.                                Strength:              normal 5/5 strength in all tested muscle groups.         Special Tests:  Forced DF/ER: Negative for pain at syndesmosis.  Mid-leg squeeze: Negative for pain at syndesmosis.  Forced Dorsiflexion: Negative for anterior joint line pain.    Forced Plantarflexion: Negative for posterior ankle pain.   Forced Inversion: Negative for lateral pain.  Forced Eversion: Negative for medial pain.        Neurovascular Exam:  Digits warm and well perfused, brisk capillary refill <3 seconds throughout  NVI motor/LTS  to median, radial, and ulnar nerves, radial pulse 2+     Other Findings:  Imaging:   3v radiographs left foot demonstrate nondisplaced fracture through the shaft of the distal phalanx of the great toe, without intra-articular extension.     ASSESSMENT & PLAN   Assessment: Nondisplaced fracture of distal phalanx of the great toe, without intra-articular extension.   The patient and I had a thorough discussion today.  We discussed the working diagnosis as well as several other potential alternative diagnoses.  Treatment options were discussed, both conservative and surgical.  Conservative treatment options would include things such as activity modifications, a period of rest, oral vs topical OTC and prescription anti-inflammatory medications, physical therapy, splinting/bracing, immobilization, and others.       Plan:   Continue buddy taping great toe to second toe  WBAT  Offered hard sole post-operative shoe, patient defers at this time as she is able to ambulate with minimal pain in normal shoe.  Continue OTC ibuprofen/tylenol as needed for pain control  Recommended rest, ice, elevation   Return to clinic in 6 weeks with repeat radiographs or sooner if symptoms worsen or fail to improve  Call with questions in the interim     Patient expressed understanding of this plan and all questions were answered.    N. Neubig, PA-C [facility]

## 2025-05-02 ENCOUNTER — APPOINTMENT (OUTPATIENT)
Dept: RADIOLOGY | Facility: HOSPITAL | Age: 60
End: 2025-05-02
Payer: COMMERCIAL

## 2025-05-02 ENCOUNTER — OFFICE VISIT (OUTPATIENT)
Dept: ORTHOPEDICS | Facility: CLINIC | Age: 60
End: 2025-05-02
Payer: COMMERCIAL

## 2025-05-02 VITALS — BODY MASS INDEX: 31.39 KG/M2 | HEIGHT: 61 IN | WEIGHT: 166.25 LBS

## 2025-05-02 DIAGNOSIS — R52 PAIN: Primary | ICD-10-CM

## 2025-05-02 DIAGNOSIS — S92.425A CLOSED NONDISPLACED FRACTURE OF DISTAL PHALANX OF LEFT GREAT TOE, INITIAL ENCOUNTER: ICD-10-CM

## 2025-05-02 DIAGNOSIS — S92.425A CLOSED NONDISPLACED FRACTURE OF DISTAL PHALANX OF LEFT GREAT TOE, INITIAL ENCOUNTER: Primary | ICD-10-CM

## 2025-05-02 PROCEDURE — 73630 X-RAY EXAM OF FOOT: CPT | Mod: 26,LT,, | Performed by: RADIOLOGY

## 2025-05-02 PROCEDURE — 99999 PR PBB SHADOW E&M-EST. PATIENT-LVL III: CPT | Mod: PBBFAC,,,

## 2025-05-02 PROCEDURE — 73630 X-RAY EXAM OF FOOT: CPT | Mod: TC,FY,PN,LT

## 2025-05-02 NOTE — PROGRESS NOTES
Established orthopedic patient, follow up visit: Foot/Ankle Pain    Initial Visit 3/18/25 HISTORY OF PRESENT ILLNESS   Cristina Fragoso, a 59 y.o. female, presents today for evaluation of her left foot. Patient reports onset of acute pain beginning Date: 3/3/25. Patient reports injury/trauma. She dropped an empty pot from a high cabinet directly onto left foot. She fell backwards and braced her fall on her right hand. Immediately had pain with weight-bearing and swelling most prominent of left great toe. She attempted initial treatment with tylenol and buddy taped great and second toe. She was seen at [facility] the following day for right hand and left foot pain. Right hand radiographs obtained did not show evidence of acute fracture or dislocation. Left foot radiographs demonstrated acute nondisplaced fracture through the shaft of the distal phalanx of the great toe, without intra-articular extension. She was recommended to continue buddy taping and prescribed tramadol. Since then, her pain and swelling to the left great toe has improved significantly. She is ambulating in normal shoe without issue. She has continued to buddy tape her great and second toe. She is taking tylenol and ibuprofen as needed. Pain is rated as 4/10 today with provacative activity including weight-bearing.  Symptoms do not interfere with sleep. Patient reports pain & symptoms are getting better . Patient reports no prior surgery to foot or ankle.     Interval history 05/02/2025:  Patient presents today for follow up of left great toe fracture.  Reports that her pain has progressively gotten better.  Reports she did stub this toe on her bed frame roughly 3 weeks ago, which did result in some throbbing pain which has been on and off for the last 2 weeks. Pain is more along the nail bed from this injury. No bruising or cracking noted to the nail. Able to wear normal shoes with minimal pain. Wearing crocs. She has continued buddy taping  for comfort.    Of note, she does mention her left shoulder has been bothersome. Has not been previously evaluated for this. She would like to schedule an appointment for this in the future.      Past Medical History:   Diagnosis Date    Depression     Herpes simplex without mention of complication     Hyperlipidemia      Past Surgical History:   Procedure Laterality Date    APPENDECTOMY      BUNIONECTOMY  2004    right foot     SECTION  1999    x1    DILATION AND CURETTAGE OF UTERUS       Family History   Problem Relation Name Age of Onset    Kidney disease Neg Hx      Breast cancer Neg Hx      Cancer Neg Hx      Colon cancer Neg Hx      Diabetes Neg Hx      Eclampsia Neg Hx      Ovarian cancer Neg Hx      Miscarriages / Stillbirths Neg Hx      Hypertension Neg Hx       labor Neg Hx      Stroke Neg Hx      Blindness Neg Hx      Glaucoma Neg Hx      Macular degeneration Neg Hx      Retinal detachment Neg Hx           Review of systems (ROS):  PAIN ASSESSMENT:  See HPI.  MUSCULOSKELETAL: See HPI.  OTHER 10 point review of systems is negative except as stated in HPI above      PHYSICAL EXAMINATION  General:  The patient is alert and oriented x 3. Mood is pleasant. Observation of ears, eyes and nose reveal no gross abnormalities. HEENT: NCAT, sclera anicteric. Lungs: Respirations are equal and unlabored.  Gait is coordinated. Patient can toe walk and heel walk without difficulty.     LEFT FOOT/ANKLE:        Observation/Inspection:    No evidence of visible deformity. Improved bruising and swelling of the dorsal aspect of left foot at base of second and great toe.  Normal gait. No difficulty with heel walking.   Hindfoot and forefoot alignment appears normal.         Palpation:   Tenderness to palpation to left great toe distal phalanx and over nail. Nail is intact.   Otherwise, negative tenderness to palpation to bony prominences and soft tissues throughout.        ROM (active and passive):  Ankle  Dorsiflexion:   15°  Ankle Plantarflexion:   45°                             Eversion:   15°  Inversion:   25°                             Able to move all toes.                                Strength:              normal 5/5 strength in all tested muscle groups.         Special Tests:  Forced DF/ER: Negative for pain at syndesmosis.  Mid-leg squeeze: Negative for pain at syndesmosis.  Forced Dorsiflexion: Negative for anterior joint line pain.    Forced Plantarflexion: Negative for posterior ankle pain.   Forced Inversion: Negative for lateral pain.  Forced Eversion: Negative for medial pain.        Neurovascular Exam:  Digits warm and well perfused, brisk capillary refill <3 seconds throughout  NVI motor/LTS to median, radial, and ulnar nerves, radial pulse 2+     Other Findings:  Imaging:   Repeat radiographs of the left foot 05/02/2025 demonstrate similar fracture seen of the distal phalanx. Looks to have filled in somewhat. No significant displacement. No intra-articular extension.   3v radiographs left foot demonstrate nondisplaced fracture through the shaft of the distal phalanx of the great toe, without intra-articular extension.     ASSESSMENT & PLAN   Assessment: Nondisplaced fracture of distal phalanx of the great toe, without intra-articular extension.     The patient and I had a thorough discussion today.  We discussed the working diagnosis as well as several other potential alternative diagnoses.  Treatment options were discussed, both conservative and surgical.  Conservative treatment options would include things such as activity modifications, a period of rest, oral vs topical OTC and prescription anti-inflammatory medications, physical therapy, splinting/bracing, immobilization, and others. Pain has improved at this stage. She is able to ambulate in normal shoe with minimal pain. Does report re-injury associated with some increased pain acutely over the last two weeks. Recommend continuing jany  taping until pain subsides. Continue to utilize hard-soled shoe. She would like to be evaluated for her shoulder in the next few weeks, I will plan to see her then and also check in on her toe pain at that point.     Plan:   Continue buddy taping.   WBAT   Continue OTC ibuprofen/tylenol as needed for pain control  Continue rest, ice, elevation as needed  Return to clinic in 2-3 weeks to be evaluated for shoulder pain and check in on toe   Call with questions in the interim     Patient expressed understanding of this plan and all questions were answered.    N. Neubig, PA-C [facility]

## 2025-05-23 ENCOUNTER — LAB VISIT (OUTPATIENT)
Dept: LAB | Facility: HOSPITAL | Age: 60
End: 2025-05-23
Payer: COMMERCIAL

## 2025-05-23 ENCOUNTER — OFFICE VISIT (OUTPATIENT)
Dept: OBSTETRICS AND GYNECOLOGY | Facility: CLINIC | Age: 60
End: 2025-05-23
Payer: COMMERCIAL

## 2025-05-23 VITALS
BODY MASS INDEX: 26.62 KG/M2 | SYSTOLIC BLOOD PRESSURE: 116 MMHG | DIASTOLIC BLOOD PRESSURE: 70 MMHG | WEIGHT: 140.88 LBS

## 2025-05-23 DIAGNOSIS — Z12.31 BREAST CANCER SCREENING BY MAMMOGRAM: ICD-10-CM

## 2025-05-23 DIAGNOSIS — Z12.11 SCREEN FOR COLON CANCER: ICD-10-CM

## 2025-05-23 DIAGNOSIS — Z01.419 ENCOUNTER FOR GYNECOLOGICAL EXAMINATION WITHOUT ABNORMAL FINDING: ICD-10-CM

## 2025-05-23 DIAGNOSIS — Z01.419 ENCOUNTER FOR GYNECOLOGICAL EXAMINATION WITHOUT ABNORMAL FINDING: Primary | ICD-10-CM

## 2025-05-23 PROCEDURE — 87389 HIV-1 AG W/HIV-1&-2 AB AG IA: CPT

## 2025-05-23 PROCEDURE — 87340 HEPATITIS B SURFACE AG IA: CPT

## 2025-05-23 PROCEDURE — 36415 COLL VENOUS BLD VENIPUNCTURE: CPT

## 2025-05-23 PROCEDURE — 86593 SYPHILIS TEST NON-TREP QUANT: CPT

## 2025-05-23 PROCEDURE — 86803 HEPATITIS C AB TEST: CPT

## 2025-05-23 PROCEDURE — 99999 PR PBB SHADOW E&M-EST. PATIENT-LVL III: CPT | Mod: PBBFAC,,,

## 2025-05-23 NOTE — PROGRESS NOTES
HISTORY OF PRESENT ILLNESS:    Cristina Fragoso is a 59 y.o. female , presents for a routine exam and has no complaints. Denies any GYN complaints. She denies vaginal bleeding, vasomotor symptoms, vaginal dryness.  She does want STD screening.  She is not currently sexually active-  passed 5 months ago.  The patient participates in regular exercise: no.  The patient does not smoke.  The patient wears seatbelts.   Pt denies any domestic violence.  Reports new tattoos or blood transfusions.     SCREENING HISTORY:  PAP:  nilm/ hpv neg (done today)  MAMMOGRAM: 2024 neg   TC:  10.73%  COLONOSCOPY: ordered today    Gyn FH:  Breast cancer: none  Colon cancer: none  Ovarian cancer: none  Endometrial cancer: none    Past Medical History:   Diagnosis Date    Depression     Herpes simplex without mention of complication     Hyperlipidemia        Past Surgical History:   Procedure Laterality Date    APPENDECTOMY      BUNIONECTOMY      right foot     SECTION  1999    x1    DILATION AND CURETTAGE OF UTERUS          MEDICATIONS AND ALLERGIES:    Current Medications[1]    Review of patient's allergies indicates:   Allergen Reactions    Acetaminophen Other (See Comments)    Bactrim  [sulfamethoxazole-trimethoprim]      Other reaction(s): Rash    Percocet  [oxycodone-acetaminophen]      Other reaction(s): Itching    Trimethoprim Other (See Comments)       Family History   Problem Relation Name Age of Onset    Kidney disease Neg Hx      Breast cancer Neg Hx      Cancer Neg Hx      Colon cancer Neg Hx      Diabetes Neg Hx      Eclampsia Neg Hx      Ovarian cancer Neg Hx      Miscarriages / Stillbirths Neg Hx      Hypertension Neg Hx       labor Neg Hx      Stroke Neg Hx      Blindness Neg Hx      Glaucoma Neg Hx      Macular degeneration Neg Hx      Retinal detachment Neg Hx         Social History[2]    ROS:  GENERAL: No weight changes. No swelling. No fatigue. No fever.  CARDIOVASCULAR: No  chest pain. No shortness of breath. No leg cramps.   NEUROLOGICAL: No headaches. No vision changes.  BREASTS: No pain. No lumps. No discharge.  ABDOMEN: No pain. No nausea. No vomiting. No diarrhea. No constipation.  REPRODUCTIVE: No abnormal bleeding.   VULVA: No pain. No lesions. No itching.  VAGINA: No relaxation. No itching. No odor. No discharge. No lesions.  URINARY: No incontinence. No nocturia. No frequency. No dysuria.    /70   Wt 63.9 kg (140 lb 14 oz)   LMP  (LMP Unknown)   BMI 26.62 kg/m²     PE:  APPEARANCE: Well nourished, well developed, in no acute distress.  AFFECT: WNL, alert and oriented x 3.  SKIN: No hirsutism or acne.  NECK: Neck symmetric without masses or thyromegaly.  NODES: No inguinal, cervical, axillary or femoral lymph node enlargement.  CHEST: Good respiratory effort.   ABDOMEN: Soft. No tenderness or masses. No hepatosplenomegaly. No hernias.  BREASTS: Symmetrical, no skin changes or visible lesions. No palpable masses, nipple discharge bilaterally.  PELVIC: ATROPHIC EXTERNAL FEMALE GENITALIA without lesions. Normal hair distribution. Adequate perineal body, normal urethral meatus. VAGINA DRY without lesions or discharge. CERVIX STENOTIC without lesions, discharge or tenderness. No significant cystocele or rectocele. Bimanual exam shows uterus to be normal size, regular, mobile and nontender. Adnexa without masses or tenderness.  EXTREMITIES: No edema.    PROCEDURES:  Pap    DIAGNOSIS:  1. Encounter for gynecological examination without abnormal finding  C. trachomatis/N. gonorrhoeae by AMP DNA    Liquid-Based Pap Smear, Screening    Hepatitis B Surface Antigen    Hepatitis C Antibody    HIV 1/2 Ag/Ab (4th Gen)    Treponema Pallidium Antibodies IgG, IgM      2. Breast cancer screening by mammogram  Mammo Digital Screening Bilat w/ Dennis (XPD)      3. Screen for colon cancer  Case Request Endoscopy: COLONOSCOPY          PLAN:    LABS AND TESTS ORDERED:  Mammogram    PLAN:  - Pap  and HPV done today.  - Screening tests as ordered.  - Diet and exercise encouraged.  Condom use encouraged for STD prevention.  Seat belt use encouraged.  Reviewed ASCCP Pap guidelines and screening recommendations.  Calcium and vitamin D recommended.     Counseling: injury prevention: Driving under the influence of alcohol  Weapons  Seatbelts  Bicycle helmets  Adequate sleep  Exercise  Perimenopause/Menopause  Stress management techniques  indications for and frequency of periodic gynecologic exam  reviewed current Pap guidelines. Explained new understanding of natural history of cervical disease and improved Paps. Recommended guideline concordant care.  Patient was counseled today on postmenopausal issues.   The patient was counseled today on osteoporosis prevention, calcium supplementation, and regular weight bearing exercise. The patient was also counseled today on ACS PAP guidelines, with recommendations for yearly pelvic exams unless their uterus, cervix, and ovaries were removed for benign reasons; in that case, examinations every 3-5 years are recommended.  The patient was also counseled regarding monthly breast self-examination, routine STD screening for at-risk populations, prophylactic immunizations for transmitted infections such as  HPV, Pertussis, or Influenza as appropriate, and yearly mammograms when indicated by ACS guidelines.  She was advised to see her primary care physician for all other health maintenance.    FOLLOW-UP with me annually.          [1]   Current Outpatient Medications:     acyclovir (ZOVIRAX) 400 MG tablet, Take by mouth. 1 Tablet Oral Twice a day , Disp: , Rfl:     FLUoxetine 20 MG capsule, Take by mouth. 1 Capsule Oral Twice a day , Disp: , Rfl:     rosuvastatin (CRESTOR) 10 MG tablet, Take 10 mg by mouth once daily., Disp: , Rfl:   [2]   Social History  Socioeconomic History    Marital status:    Tobacco Use    Smoking status: Former     Current packs/day: 0.00      Average packs/day: 0.3 packs/day for 15.0 years (3.8 ttl pk-yrs)     Types: Cigarettes     Start date: 10/5/1978     Quit date: 10/5/1993     Years since quittin.6    Smokeless tobacco: Never   Substance and Sexual Activity    Alcohol use: Yes     Comment: social    Drug use: No    Sexual activity: Yes     Partners: Male     Birth control/protection: Condom, Post-menopausal     Social Drivers of Health     Financial Resource Strain: Low Risk  (3/11/2025)    Overall Financial Resource Strain (CARDIA)     Difficulty of Paying Living Expenses: Not hard at all   Food Insecurity: No Food Insecurity (3/11/2025)    Hunger Vital Sign     Worried About Running Out of Food in the Last Year: Never true     Ran Out of Food in the Last Year: Never true   Transportation Needs: Unmet Transportation Needs (3/11/2025)    PRAPARE - Transportation     Lack of Transportation (Medical): Yes     Lack of Transportation (Non-Medical): Yes   Physical Activity: Unknown (3/11/2025)    Exercise Vital Sign     Days of Exercise per Week: 0 days   Stress: Stress Concern Present (3/11/2025)    [employer] [place] [employer] - Occupational Stress Questionnaire     Feeling of Stress : Very much   Housing Stability: Low Risk  (3/11/2025)    Housing Stability Vital Sign     Unable to Pay for Housing in the Last Year: No     Homeless in the Last Year: No

## 2025-05-24 LAB
HBV SURFACE AG SERPL QL IA: NORMAL
HCV AB SERPL QL IA: NORMAL
HIV 1+2 AB+HIV1 P24 AG SERPL QL IA: NORMAL
T PALLIDUM IGG+IGM SER QL: NORMAL

## 2025-05-26 ENCOUNTER — OFFICE VISIT (OUTPATIENT)
Dept: PRIMARY CARE CLINIC | Facility: CLINIC | Age: 60
End: 2025-05-26
Attending: FAMILY MEDICINE
Payer: COMMERCIAL

## 2025-05-26 ENCOUNTER — OFFICE VISIT (OUTPATIENT)
Dept: ORTHOPEDICS | Facility: CLINIC | Age: 60
End: 2025-05-26
Payer: COMMERCIAL

## 2025-05-26 ENCOUNTER — LAB VISIT (OUTPATIENT)
Dept: LAB | Facility: HOSPITAL | Age: 60
End: 2025-05-26
Payer: COMMERCIAL

## 2025-05-26 VITALS
HEART RATE: 73 BPM | HEIGHT: 61 IN | DIASTOLIC BLOOD PRESSURE: 79 MMHG | BODY MASS INDEX: 27.01 KG/M2 | WEIGHT: 143.06 LBS | SYSTOLIC BLOOD PRESSURE: 127 MMHG | OXYGEN SATURATION: 99 %

## 2025-05-26 VITALS — HEIGHT: 61 IN | WEIGHT: 143.06 LBS | BODY MASS INDEX: 27.01 KG/M2

## 2025-05-26 DIAGNOSIS — E78.2 MIXED HYPERLIPIDEMIA: ICD-10-CM

## 2025-05-26 DIAGNOSIS — F33.41 RECURRENT MAJOR DEPRESSIVE DISORDER, IN PARTIAL REMISSION: ICD-10-CM

## 2025-05-26 DIAGNOSIS — M25.512 ACUTE PAIN OF LEFT SHOULDER: Primary | ICD-10-CM

## 2025-05-26 DIAGNOSIS — R20.2 NUMBNESS AND TINGLING OF LEFT UPPER EXTREMITY: ICD-10-CM

## 2025-05-26 DIAGNOSIS — R20.2 NUMBNESS AND TINGLING IN LEFT ARM: ICD-10-CM

## 2025-05-26 DIAGNOSIS — R20.0 NUMBNESS AND TINGLING OF LEFT UPPER EXTREMITY: ICD-10-CM

## 2025-05-26 DIAGNOSIS — Z00.01 ENCOUNTER FOR GENERAL ADULT MEDICAL EXAMINATION WITH ABNORMAL FINDINGS: ICD-10-CM

## 2025-05-26 DIAGNOSIS — R20.0 NUMBNESS AND TINGLING IN LEFT ARM: ICD-10-CM

## 2025-05-26 DIAGNOSIS — Z00.01 ENCOUNTER FOR GENERAL ADULT MEDICAL EXAMINATION WITH ABNORMAL FINDINGS: Primary | ICD-10-CM

## 2025-05-26 DIAGNOSIS — Z23 NEED FOR VACCINATION AGAINST STREPTOCOCCUS PNEUMONIAE: ICD-10-CM

## 2025-05-26 DIAGNOSIS — M54.12 CERVICAL RADICULOPATHY: ICD-10-CM

## 2025-05-26 DIAGNOSIS — M25.512 TRIGGER POINT OF LEFT SHOULDER REGION: ICD-10-CM

## 2025-05-26 DIAGNOSIS — Z23 NEED FOR TDAP VACCINATION: ICD-10-CM

## 2025-05-26 DIAGNOSIS — M62.830 SPASM OF LEFT TRAPEZIUS MUSCLE: ICD-10-CM

## 2025-05-26 LAB
ABSOLUTE EOSINOPHIL (OHS): 0.13 K/UL
ABSOLUTE MONOCYTE (OHS): 0.42 K/UL (ref 0.3–1)
ABSOLUTE NEUTROPHIL COUNT (OHS): 3.42 K/UL (ref 1.8–7.7)
ALBUMIN SERPL BCP-MCNC: 3.8 G/DL (ref 3.5–5.2)
ALP SERPL-CCNC: 99 UNIT/L (ref 40–150)
ALT SERPL W/O P-5'-P-CCNC: 38 UNIT/L (ref 10–44)
ANION GAP (OHS): 9 MMOL/L (ref 8–16)
AST SERPL-CCNC: 25 UNIT/L (ref 11–45)
BASOPHILS # BLD AUTO: 0.04 K/UL
BASOPHILS NFR BLD AUTO: 0.6 %
BILIRUB SERPL-MCNC: 1.3 MG/DL (ref 0.1–1)
BUN SERPL-MCNC: 10 MG/DL (ref 6–20)
CALCIUM SERPL-MCNC: 9 MG/DL (ref 8.7–10.5)
CHLORIDE SERPL-SCNC: 108 MMOL/L (ref 95–110)
CHOLEST SERPL-MCNC: 249 MG/DL (ref 120–199)
CHOLEST/HDLC SERPL: 3 {RATIO} (ref 2–5)
CO2 SERPL-SCNC: 26 MMOL/L (ref 23–29)
CREAT SERPL-MCNC: 0.8 MG/DL (ref 0.5–1.4)
EAG (OHS): 108 MG/DL (ref 68–131)
ERYTHROCYTE [DISTWIDTH] IN BLOOD BY AUTOMATED COUNT: 12 % (ref 11.5–14.5)
GFR SERPLBLD CREATININE-BSD FMLA CKD-EPI: >60 ML/MIN/1.73/M2
GLUCOSE SERPL-MCNC: 82 MG/DL (ref 70–110)
HBA1C MFR BLD: 5.4 % (ref 4–5.6)
HCT VFR BLD AUTO: 39.1 % (ref 37–48.5)
HDLC SERPL-MCNC: 84 MG/DL (ref 40–75)
HDLC SERPL: 33.7 % (ref 20–50)
HGB BLD-MCNC: 13.2 GM/DL (ref 12–16)
IMM GRANULOCYTES # BLD AUTO: 0.02 K/UL (ref 0–0.04)
IMM GRANULOCYTES NFR BLD AUTO: 0.3 % (ref 0–0.5)
LDLC SERPL CALC-MCNC: 150 MG/DL (ref 63–159)
LYMPHOCYTES # BLD AUTO: 2.15 K/UL (ref 1–4.8)
MCH RBC QN AUTO: 28.3 PG (ref 27–31)
MCHC RBC AUTO-ENTMCNC: 33.8 G/DL (ref 32–36)
MCV RBC AUTO: 84 FL (ref 82–98)
NONHDLC SERPL-MCNC: 165 MG/DL
NUCLEATED RBC (/100WBC) (OHS): 0 /100 WBC
PLATELET # BLD AUTO: 297 K/UL (ref 150–450)
PMV BLD AUTO: 9.4 FL (ref 9.2–12.9)
POTASSIUM SERPL-SCNC: 4.5 MMOL/L (ref 3.5–5.1)
PROT SERPL-MCNC: 7.4 GM/DL (ref 6–8.4)
RBC # BLD AUTO: 4.67 M/UL (ref 4–5.4)
RELATIVE EOSINOPHIL (OHS): 2.1 %
RELATIVE LYMPHOCYTE (OHS): 34.8 % (ref 18–48)
RELATIVE MONOCYTE (OHS): 6.8 % (ref 4–15)
RELATIVE NEUTROPHIL (OHS): 55.4 % (ref 38–73)
SODIUM SERPL-SCNC: 143 MMOL/L (ref 136–145)
TRIGL SERPL-MCNC: 75 MG/DL (ref 30–150)
TSH SERPL-ACNC: 0.77 UIU/ML (ref 0.4–4)
WBC # BLD AUTO: 6.18 K/UL (ref 3.9–12.7)

## 2025-05-26 PROCEDURE — 99999 PR PBB SHADOW E&M-EST. PATIENT-LVL III: CPT | Mod: PBBFAC,,,

## 2025-05-26 PROCEDURE — 99213 OFFICE O/P EST LOW 20 MIN: CPT | Mod: 25,S$GLB,, | Performed by: FAMILY MEDICINE

## 2025-05-26 PROCEDURE — 3008F BODY MASS INDEX DOCD: CPT | Mod: CPTII,S$GLB,, | Performed by: FAMILY MEDICINE

## 2025-05-26 PROCEDURE — 80053 COMPREHEN METABOLIC PANEL: CPT

## 2025-05-26 PROCEDURE — 99999 PR PBB SHADOW E&M-EST. PATIENT-LVL IV: CPT | Mod: PBBFAC,,, | Performed by: FAMILY MEDICINE

## 2025-05-26 PROCEDURE — 85025 COMPLETE CBC W/AUTO DIFF WBC: CPT

## 2025-05-26 PROCEDURE — 3074F SYST BP LT 130 MM HG: CPT | Mod: CPTII,S$GLB,, | Performed by: FAMILY MEDICINE

## 2025-05-26 PROCEDURE — 99214 OFFICE O/P EST MOD 30 MIN: CPT | Mod: S$GLB,,,

## 2025-05-26 PROCEDURE — 82465 ASSAY BLD/SERUM CHOLESTEROL: CPT

## 2025-05-26 PROCEDURE — 84443 ASSAY THYROID STIM HORMONE: CPT

## 2025-05-26 PROCEDURE — 99386 PREV VISIT NEW AGE 40-64: CPT | Mod: S$GLB,,, | Performed by: FAMILY MEDICINE

## 2025-05-26 PROCEDURE — 3008F BODY MASS INDEX DOCD: CPT | Mod: CPTII,S$GLB,,

## 2025-05-26 PROCEDURE — 83036 HEMOGLOBIN GLYCOSYLATED A1C: CPT

## 2025-05-26 PROCEDURE — 1159F MED LIST DOCD IN RCRD: CPT | Mod: CPTII,S$GLB,,

## 2025-05-26 PROCEDURE — 1159F MED LIST DOCD IN RCRD: CPT | Mod: CPTII,S$GLB,, | Performed by: FAMILY MEDICINE

## 2025-05-26 PROCEDURE — 1160F RVW MEDS BY RX/DR IN RCRD: CPT | Mod: CPTII,S$GLB,, | Performed by: FAMILY MEDICINE

## 2025-05-26 PROCEDURE — 36415 COLL VENOUS BLD VENIPUNCTURE: CPT

## 2025-05-26 PROCEDURE — 3078F DIAST BP <80 MM HG: CPT | Mod: CPTII,S$GLB,, | Performed by: FAMILY MEDICINE

## 2025-05-26 NOTE — PROGRESS NOTES
Established orthopedic patient, new problem visit: Shoulder     PRIMARY CARE PHYSICIAN: Fannie Clarke DO   REFERRING PROVIDER: No referring provider defined for this encounter.     ASSESSMENT & PLAN:    Impression:  Left trapezius muscle spasm   Suspected cervical radiculopathy     Non operative care:    Cristina Fragoso has physical exam evidence of above and wishes to pursue an non-operative care. The patient and I had a thorough discussion today. We discussed the working diagnosis as well as several other potential alternative diagnoses. Treatment options were discussed, both conservative and surgical. Conservative treatment options would include things such as activity modifications, a period of rest, oral vs topical OTC and prescription anti-inflammatory medications, physical therapy versus HEP, splinting/bracing, immobilization, corticosteroid injections, and others. I am recommending the following:   Okay to continue over-the-counter medications as needed  Recommend formal physical therapy  If she continues to have symptoms consistent with cervical radiculopathy, we would likely recommend baseline cervical spine radiographs and potential referral to pain management  Return to clinic in 12 weeks or sooner if needed  Call in the interim with any questions    Questions were answered and patient verbalized understanding of the plan.  N. Neubig, PA-C [facility]         The patient has been ordered:  Physical therapy     CONSULTS:   None     ACTIVE PROBLEM LIST  Problem List[1]        SUBJECTIVE    CHIEF COMPLAINT: Shoulder Pain    Initial visit 05/26/2025 HPI:   Cristina Fragoso is a 59 y.o. right hand dominant female who presents to clinic for intermittent left shoulder pain. The patient denies known SAHIL.  The pain started 6 weeks ago and is becoming progressively worse.  Pain is located over (points to) posterior shoulder . She reports that the pain is a 7 /10 aching, burning, and radiating  "pain today. Pain radiates from posterior left shoulder distally into hand. The pain is aggravated by lifting, elevation, lying flat. Pain improved with neck flexion/extension, holding arm in sling position. Endorses numbness, tingling, radiation. The pain is affecting ADLs and limiting desired level of activity. There is not a history of previous surgery to the shoulder.      Previous treatments include acetaminophen and NSAIDs which have provided minimal relief.     Cristina Fragoso has no additional complaints.      REVIEW OF SYSTEMS:  PAIN ASSESSMENT:  See HPI.  MUSCULOSKELETAL: See HPI.      PAST MEDICAL HISTORY   has a past medical history of Biliary colic (10/31/2016), Depression, Elevated bilirubin (10/30/2016), Elevated liver enzymes (10/30/2016), Herpes simplex without mention of complication, and Hyperlipidemia.     PAST SURGICAL HISTORY   has a past surgical history that includes Appendectomy (); Bunionectomy (); Dilation and curettage of uterus; and  section ().     FAMILY HISTORY  family history includes Hypertension in her father; Kidney disease in her father; No Known Problems in her brother, daughter, daughter, granddaughter, mother, and sister.     SOCIAL HISTORY   reports that she quit smoking about 31 years ago. Her smoking use included cigarettes. She started smoking about 46 years ago. She has a 3.8 pack-year smoking history. She has never used smokeless tobacco. She reports current alcohol use. She reports that she does not use drugs.     ALLERGIES:   Review of patient's allergies indicates:   Allergen Reactions    Acetaminophen Other (See Comments)    Bactrim  [sulfamethoxazole-trimethoprim]      Other reaction(s): Rash    Percocet  [oxycodone-acetaminophen]      Other reaction(s): Itching    Trimethoprim Other (See Comments)        MEDICATIONS:   Medications Ordered Prior to Encounter[2]       PHYSICAL EXAM   height is 5' 1" (1.549 m) and weight is 64.9 kg (143 lb 1.3 " oz).     All other systems deferred.  GENERAL:  No acute distress  HABITUS: Normal  GAIT: Coordinated   SKIN: Normal    SHOULDER EXAM:    Shoulder Range of Motion    Right     Left   (Active/Passive)       Forward Elevation     165/165             165/165   External rotation (arm at side)  50/50             50/50    Internal rotation behind the back  L5             L5     Range of motion is mildly painful - felt posteriorly     Acromioclavicular joint is not tender  Crossbody test: negative    Neer's negative  Hawkin's positive    Paty's negative  Drop arm negative  Belly press negative    Cuff Strength     Right     Left   Supraspinatus        5/5    5/5  Infraspinatus     5/5    5/5  Subscapularis     5/5    5/5    Deltoid testing            5/5    5/5    Levi's test negative  Speeds negative  Yergasons negative    TTP along left cervical paraspinal muscles, trapezius    Elbow examination demonstrates no tenderness to palpation and has normal range of motion.     ltsi C5-T1  + epl, io, fds, fdp   2+ RP         DATA:  Diagnostic tests reviewed for today's visit:       3 views of the left shoulder:  positive for degenerative changes of the AC joint. The humeral head is well centered on the AP and axillary views.  There is not significant degenerative change of the glenohumeral joint or posterior subluxation of the humeral head. No acute changes or fracture.          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 1  Tablet Oral Twice a day       FLUoxetine 20 MG capsule Take by mouth. 1 Capsule Oral Twice a day       rosuvastatin (CRESTOR) 10 MG tablet Take 10 mg by mouth once daily.       No current facility-administered medications on file prior to visit.

## 2025-05-26 NOTE — PROGRESS NOTES
FAMILY MEDICINE  [facility]    Reason for visit:   Chief Complaint   Patient presents with    Establish Care    Numbness         SUBJECTIVE: Cristina Fragoso is a 59 y.o. female  - with hyperlipidemia and depression presents as a new patient to establish care, and discuss numbness and tingling left arm Last PCP Dr. Feng, Kobe LANDA MD    Gynecology Yan Camacho, NP  Mitra Ruiz PA-C     Reports colonoscopy completed 2020 and normal with rec to repeat 10 years. Copy requested.    1. Depression    Age diagnosed: 35 yo     Today 05/26/2025 :  Cristina Fragoso reports that she has been stable on her anti-depressant medication fluoxetine for at least 25 years since the birth of the second daughter. She does not want to stop the medication jennifer now since she lost her  12/2025. She has good support and her daughter has moved back in to assist    Prior notes: NA    Panic attacks: denies  Hopelessness:  denies  Sleep issues:  denies  Suicidal thoughts:  denies  Thoughts of self harm: denies  Thoughts of harm to others:  denies  History of suicide attempts:  denies  Family history of suicide: denies    Psychiatrist: none  Psychologist: none  Counselor : none    Prior medication: denies    Current medications:    FLUoxetine 20 MG capsule, Take by mouth. 1 Capsule Oral Twice a day , Disp:     Side effects of current treatment: None    Support system: family      2. Hyperlipidemia  - LDL goal < 100    Current treatment:   rosuvastatin (CRESTOR) 10 MG tablet, Take 10 mg by mouth once daily., Disp:    Side effects from current treatment: none    Lab Results       Component                Value               Date                       CHOL                     201 (H)             10/31/2016            Lab Results       Component                Value               Date                       HDL                      71                  10/31/2016            Lab Results       Component                 Value               Date                       LDLCALC                  114.6               10/31/2016            Lab Results       Component                Value               Date                       TRIG                     77                  10/31/2016            Lab Results       Component                Value               Date                       CHOLHDL                  35.3                10/31/2016          3. Tingling left arm    Cristina Fragoso reports on and off tingling left arm that is exacerbated by lying down. She reports that she has seen order and a shoulder Xray is scheduled or today. No constant. No related with activity. No weakness.                     Review of Systems   All other systems reviewed and are negative.      HEALTH MAINTENANCE:   Health Maintenance   Topic Date Due    TETANUS VACCINE  Never done    Hemoglobin A1c (Diabetic Prevention Screening)  Never done    Colorectal Cancer Screening  Never done    Shingles Vaccine (1 of 2) Never done    Pneumococcal Vaccines (Age 50+) (1 of 1 - PCV) Never done    Lipid Panel  10/31/2021    Mammogram  07/29/2025    Cervical Cancer Screening  05/28/2026    RSV Vaccine (Age 60+ and Pregnant patients) (1 - 1-dose 75+ series) 11/02/2042    Hepatitis C Screening  Completed    Influenza Vaccine  Completed    HIV Screening  Completed    COVID-19 Vaccine  Completed     Health Maintenance Topics with due status: Not Due       Topic Last Completion Date    Cervical Cancer Screening 05/28/2021    RSV Vaccine (Age 60+ and Pregnant patients) Not Due     Health Maintenance Due   Topic Date Due    TETANUS VACCINE  Never done    Hemoglobin A1c (Diabetic Prevention Screening)  Never done    Colorectal Cancer Screening  Never done    Shingles Vaccine (1 of 2) Never done    Pneumococcal Vaccines (Age 50+) (1 of 1 - PCV) Never done    Lipid Panel  10/31/2021    Mammogram  07/29/2025       HISTORY:   Past Medical History:   Diagnosis Date     Biliary colic 10/31/2016    Depression     Elevated bilirubin 10/30/2016    Elevated liver enzymes 10/30/2016    Herpes simplex without mention of complication     Hyperlipidemia        Past Surgical History:   Procedure Laterality Date    APPENDECTOMY  1992    BUNIONECTOMY  2004    right foot     SECTION  1999    x1    DILATION AND CURETTAGE OF UTERUS         Family History   Problem Relation Name Age of Onset    No Known Problems Mother      Hypertension Father      Kidney disease Father          dialysis    No Known Problems Sister      No Known Problems Brother      No Known Problems Daughter      No Known Problems Daughter      No Known Problems Granddaughter      Breast cancer Neg Hx      Cancer Neg Hx      Colon cancer Neg Hx      Diabetes Neg Hx      Eclampsia Neg Hx      Ovarian cancer Neg Hx      Miscarriages / Stillbirths Neg Hx       labor Neg Hx      Stroke Neg Hx      Blindness Neg Hx      Glaucoma Neg Hx      Macular degeneration Neg Hx      Retinal detachment Neg Hx         Social History[1]    Social History     Social History Narrative     since 2024 (Jerald  10 years). From [facility].     Lives with her daughter and granddaughter. 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 1 Tablet Oral Twice a day        FLUoxetine 20 MG capsule Take by mouth. 1 Capsule Oral Twice a day        rosuvastatin (CRESTOR) 10 MG tablet Take 10 mg by mouth once daily.            Vital signs:   Vitals:    25 0857  "  BP: 127/79   Patient Position: Sitting   Pulse: 73   SpO2: 99%   Weight: 64.9 kg (143 lb 1.3 oz)   Height: 5' 1" (1.549 m)     Body mass index is 27.03 kg/m².    PHYSICAL EXAM:     Physical Exam  Vitals reviewed.   Constitutional:       General: She is not in acute distress.  HENT:      Head: Normocephalic and atraumatic.      Right Ear: Tympanic membrane and ear canal normal.      Left Ear: Tympanic membrane and ear canal normal.   Eyes:      General: No scleral icterus.     Conjunctiva/sclera: Conjunctivae normal.   Neck:      Thyroid: No thyromegaly.      Vascular: No carotid bruit.      Trachea: Trachea normal.      Comments: Spurling's test negative  Trigger point left trap reproduced symptoms  Lying flat reproduced symptoms  Flexing and extended neck relieved symptoms  Cardiovascular:      Rate and Rhythm: Normal rate and regular rhythm.      Heart sounds: Normal heart sounds. No murmur heard.     No friction rub. No gallop.   Pulmonary:      Effort: Pulmonary effort is normal.      Breath sounds: Normal breath sounds. No wheezing or rales.   Abdominal:      General: Bowel sounds are normal. There is no distension.      Palpations: Abdomen is soft.      Tenderness: There is no abdominal tenderness.   Musculoskeletal:      Cervical back: Normal range of motion and neck supple.      Right lower leg: No edema.      Left lower leg: No edema.   Lymphadenopathy:      Cervical: No cervical adenopathy.   Skin:     General: Skin is warm.      Capillary Refill: Capillary refill takes less than 2 seconds.   Neurological:      Mental Status: She is alert.             PERTINENT RESULTS:   Lab Visit on 05/23/2025   Component Date Value Ref Range Status    Hep BsAg Interp 05/23/2025 Non-Reactive  Non-Reactive Final    Hep C Ab Interp 05/23/2025 Non-Reactive  Non-Reactive Final    HIV 1/2 Ag/Ab 05/23/2025 Non-Reactive  Non-Reactive Final    Treponema Pallidum Antibodies IgG,* 05/23/2025 Non-Reactive  Non-Reactive Final    " RPR and/or RPR Titer to follow on all reactive results.       ASSESSMENT/PLAN:    1. Encounter for general adult medical examination with abnormal findings  -     TSH; Future; Expected date: 05/26/2025  -     Lipid Panel; Future; Expected date: 05/26/2025  -     Hemoglobin A1C; Future; Expected date: 05/26/2025  -     Comprehensive Metabolic Panel; Future; Expected date: 05/26/2025  -     CBC Auto Differential; Future; Expected date: 05/26/2025    2. Numbness and tingling in left arm  Overview:  - discussed differential  - does not appear cardiac in nature since reproducible on exam  - mostly likely trigger point with or without cervical radiculopathy  - shoulder Xray pending  - recommend PT  - if does not improve, recommend re-evaluation and cervical Xray     Orders:  -     Ambulatory Referral/Consult to Physical Therapy    3. Trigger point of left shoulder region  Overview:  - see numbness and tingling    Orders:  -     Ambulatory Referral/Consult to Physical Therapy    4. Recurrent major depressive disorder, in partial remission  Overview:  - grief with recent loss and good support  - continue current medication      5. Mixed hyperlipidemia  Overview:  LDL Cholesterol   Date Value Ref Range Status   10/31/2016 114.6 63.0 - 159.0 mg/dL Final     Comment:     The National Cholesterol Education Program (NCEP) has set the  following guidelines (reference values) for LDL Cholesterol:  Optimal.......................<130 mg/dL  Borderline High...............130-159 mg/dL  High..........................160-189 mg/dL  Very High.....................>190 mg/dL     - repeat lipids      6. Need for Tdap vaccination  -     Discontinue: DIPH,PERTUSS(ACEL),TET VAC(PF)(ADULT)(ADACEL)(TDaP)  -     DIPH,PERTUSS,ACEL,,TET VAC,PF, ADULT (ADACEL) 2 Lf-(2.5-5-3-5 mcg)-5Lf/0.5 mL Syrg; Inject 0.5 mLs into the muscle once. for 1 dose  Dispense: 0.5 mL; Refill: 0    7. Need for vaccination against Streptococcus pneumoniae  -      Discontinue: (VFC) PCV20 (Prevnar 20) IM vaccine (>/= 6 wks)  -     pneumoc 20-benitez conj-dip cr,PF, (PREVNAR-20, PF,) 0.5 mL Syrg injection; Inject 0.5 mLs into the muscle once. for 1 dose  Dispense: 0.5 mL; Refill: 0          ORDERS:   Orders Placed This Encounter    TSH    Lipid Panel    Hemoglobin A1C    Comprehensive Metabolic Panel    CBC Auto Differential    Ambulatory Referral/Consult to Physical Therapy    DIPH,PERTUSS,ACEL,,TET VAC,PF, ADULT (ADACEL) 2 Lf-(2.5-5-3-5 mcg)-5Lf/0.5 mL Syrg    pneumoc 20-benitez conj-dip cr,PF, (PREVNAR-20, PF,) 0.5 mL Syrg injection       Vaccines recommended: PCV 20, Tdap, shingles and covid-19    Follow up in about 1 year (around 2026), or if symptoms worsen or fail to improve. or sooner with any concerns      This encounter was dictated and transcribed using DeepScribe and FluencyDirect, please excuse any typographical or grammatical errors.    Dr. Fannie Clarke D.O.   Family Medicine         [1]   Social History  Tobacco Use    Smoking status: Former     Current packs/day: 0.00     Average packs/day: 0.3 packs/day for 15.0 years (3.8 ttl pk-yrs)     Types: Cigarettes     Start date: 10/5/1978     Quit date: 10/5/1993     Years since quittin.6    Smokeless tobacco: Never   Substance Use Topics    Alcohol use: Yes     Comment: social    Drug use: No

## 2025-05-27 ENCOUNTER — RESULTS FOLLOW-UP (OUTPATIENT)
Dept: PRIMARY CARE CLINIC | Facility: CLINIC | Age: 60
End: 2025-05-27

## 2025-05-27 DIAGNOSIS — E78.2 MIXED HYPERLIPIDEMIA: Primary | ICD-10-CM

## 2025-05-28 ENCOUNTER — PATIENT OUTREACH (OUTPATIENT)
Dept: ADMINISTRATIVE | Facility: HOSPITAL | Age: 60
End: 2025-05-28
Payer: COMMERCIAL

## 2025-06-02 ENCOUNTER — RESULTS FOLLOW-UP (OUTPATIENT)
Dept: OBSTETRICS AND GYNECOLOGY | Facility: CLINIC | Age: 60
End: 2025-06-02

## 2025-06-03 RX ORDER — ROSUVASTATIN CALCIUM 10 MG/1
10 TABLET, COATED ORAL DAILY
Qty: 90 TABLET | Refills: 1 | Status: SHIPPED | OUTPATIENT
Start: 2025-06-03 | End: 2025-11-30

## 2025-06-03 NOTE — TELEPHONE ENCOUNTER
Please schedule 3 months virtual visit with labs prior. Let her know to monitor that her cholesterol gets better on medication. I sent her a App[employer] message

## 2025-06-23 ENCOUNTER — PATIENT MESSAGE (OUTPATIENT)
Dept: PRIMARY CARE CLINIC | Facility: CLINIC | Age: 60
End: 2025-06-23
Payer: COMMERCIAL

## 2025-06-27 ENCOUNTER — CLINICAL SUPPORT (OUTPATIENT)
Dept: REHABILITATION | Facility: HOSPITAL | Age: 60
End: 2025-06-27
Attending: FAMILY MEDICINE
Payer: COMMERCIAL

## 2025-06-27 DIAGNOSIS — M62.89 MUSCLE TIGHTNESS: Primary | ICD-10-CM

## 2025-06-27 DIAGNOSIS — R53.1 WEAKNESS: ICD-10-CM

## 2025-06-27 PROCEDURE — 97161 PT EVAL LOW COMPLEX 20 MIN: CPT | Mod: PN

## 2025-06-27 PROCEDURE — 97110 THERAPEUTIC EXERCISES: CPT | Mod: PN

## 2025-06-27 NOTE — PROGRESS NOTES
Outpatient Rehab    Physical Therapy Evaluation    Patient Name: Cristina Fragoso  MRN: [id number]  YOB: 1965  Encounter Date: 6/27/2025    Therapy Diagnosis:   Encounter Diagnoses   Name Primary?    Muscle tightness Yes    Weakness      Physician: Fannie Clarke DO    Physician Orders: Eval and Treat  Medical Diagnosis: Numbness and tingling in left arm  Trigger point of left shoulder region  Surgical Diagnosis: Not applicable for this Episode   Surgical Date: Not applicable for this Episode  Days Since Last Surgery: Not applicable for this Episode    Visit # / Visits Authorized:  1 / 1  Insurance Authorization Period: 6/27/2025 to 12/31/2025  Date of Evaluation: 6/27/2025  Plan of Care Certification: 6/27/2025 to 12/31/25     Time In: 0800   Time Out: 0845  Total Time (in minutes): 45   Total Billable Time (in minutes):  45 minutes     Intake Outcome Measure for FOTO Survey    Therapist reviewed FOTO scores for Cristina Fragoso on 6/27/2025.   FOTO report - see Media section or FOTO account episode details.     Intake Score: 51.7%    Precautions:       Subjective   History of Present Illness  Cristina is a 59 y.o. female who reports to physical therapy with a chief concern of L shoulder pain.     The patient reports a medical diagnosis of R20.0,R20.2 (ICD-10-CM) - Numbness and tingling in left arm  M25.512 (ICD-10-CM) - Trigger point of left shoulder region.    Diagnostic tests related to this condition: X-ray.   X-Ray Details: FINDINGS:  No acute fracture or malalignment.  Preserved glenohumeral articulation.  Spurring about both sides of the acromioclavicular articulation.  No findings of calcific tendinitis    History of Present Condition/Illness: Patient reports that her pain started about 3 months ago, originating in the L shoulder and radiating down to her L fingers. She cannot sleep on that side, and pain has worsened since onset. Ice pack, lidocaine, and Tylenol provide slight relief. She  initially thought it was heart-related but was evaluated by her PCP, who ruled out cardiac causes. She has a cardiology appointment scheduled. Pain is located over the posterior shoulder  and rated 7/10 today. Described as aching, burning, and radiating down the arm to the hand. Aggravating factors include lifting, elevation, and lying on the L side. Pain improves with neck flexion/extension and holding the arm in a sling position. Reports pain with overhead movements and avoids using the arm, relying more on her dominant R arm. Patient's goals: relieve pain and improve mobility in the L arm.     Review of Systems  Patient denies: Chest Pain, Dizziness, Fainting, Cardiac History, and Osteoarthritis        Treatment History  Treatments  Previously Received Treatments: No  Currently Receiving Treatments: No    Living Arrangements  Living Situation  Living Arrangements: Other (Comment)  Other Living Arrangements Comment: daughter and granddaughter  Support Systems: Family members        Employment  Patient reports: Does the patient's condition impact their ability to work?  Employment Status: Employed full-time   No issues with her work duties.       Past Medical History/Physical Systems Review:   Cristina Fragoso  has a past medical history of Biliary colic, Depression, Elevated bilirubin, Elevated liver enzymes, Herpes simplex without mention of complication, and Hyperlipidemia.    Cristina Fragoso  has a past surgical history that includes Appendectomy (); Bunionectomy (); Dilation and curettage of uterus; and  section ().    Cristina has a current medication list which includes the following prescription(s): acyclovir, fluoxetine, and rosuvastatin.    Review of patient's allergies indicates:   Allergen Reactions    Acetaminophen Other (See Comments)    Bactrim  [sulfamethoxazole-trimethoprim]      Other reaction(s): Rash    Percocet  [oxycodone-acetaminophen]      Other reaction(s): Itching     Trimethoprim Other (See Comments)        Objective   Posture  Patient presents with a Forward head position.     Shoulders are Rounded.             Subcranial Range of Motion   Active Restricted? Passive Restricted? Pain   Flexion         Protraction         Retraction           Cervical Range of Motion   Active (deg) Passive (deg) Pain   Flexion 60 (6/10 pain in L shoulder)       Extension 45 (6/10 pain in L shoulder)       Right Lateral Flexion 35 (6/10 pain in L shoulder)       Right Rotation 65 (6/10 pain in L shoulder)       Left Lateral Flexion 30 (6/10 pain in L shoulder)       Left Rotation 65 (6/10 pain in L shoulder)   Yes          Shoulder Range of Motion  Right Shoulder   Active (deg) Passive (deg) Pain   Flexion 170       Extension 170       Scaption         ABduction         ADduction         Horizontal ABduction         Horizontal ADduction         External Rotation (Shoulder ABducted 0 degrees)         External Rotation (Shoulder ABducted 45 degrees)         External Rotation (Shoulder ABducted 90 degrees) 100       Internal Rotation (Shoulder ABducted 0 degrees)         Internal Rotation (Shoulder ABducted 45 degrees)         Internal Rotation (Shoulder ABducted 90 degrees) 95         Left Shoulder   Active (deg) Passive (deg) Pain   Flexion 120 (8/10 pain)   Yes   Extension 120 (8/10 pain) 145 Yes   Scaption         ABduction         ADduction         Horizontal ABduction         Horizontal ADduction         External Rotation (Shoulder ABducted 0 degrees)         External Rotation (Shoulder ABducted 45 degrees)         External Rotation (Shoulder ABducted 90 degrees)   100 Yes   Internal Rotation (Shoulder ABducted 0 degrees)         Internal Rotation (Shoulder ABducted 45 degrees)         Internal Rotation (Shoulder ABducted 90 degrees)  (10/10 L shoulder pain) 70 Yes                 Shoulder Strength - Planes of Motion   Right Strength Right Pain Left Strength Left  Pain   Flexion 5   4-      Extension           ABduction 5   4-     ADduction           Horizontal ABduction           Horizontal ADduction           Internal Rotation 0° 5   4-     Internal Rotation 90°           External Rotation 0° 5   4-     External Rotation 90°                            Treatment:  Therapeutic Exercise  TE 1: Upper trap stretch  TE 2: Levator scap stretch  TE 3: Scapular retraction      Time Entry(in minutes):  PT Evaluation (Low) Time Entry: 35  Therapeutic Exercise Time Entry: 10    Assessment & Plan   Assessment  Cristina presents with a condition of Low complexity.   Presentation of Symptoms: Stable       Functional Limitations: Carrying objects, Completing work/school activities, Functional mobility, Pain when reaching, Participating in leisure activities, Range of motion, Reaching  Impairments: Abnormal or restricted range of motion, Impaired physical strength, Lack of appropriate home exercise program, Pain with functional activity  Personal Factors Affecting Prognosis: Pain    Prognosis: Good  Assessment Details: Cristina referred to outpatient Physical Therapy with a medical diagnosis of R20.0,R20.2 (ICD-10-CM) - Numbness and tingling in left arm, M25.512 (ICD-10-CM) - Trigger point of left shoulder region. Upon evaluation, patient presents with decreased ROM, muscle strength, and flexibility. Patient also presents with increased pain, as well as impaired posture. PT will focus on addressing the patient's impairments to enhance function and assist in returning the [facility] to [facility]. The subjective and objective findings are addressed through specific goals outlined in the plan of care.      Plan  From a physical therapy perspective, the patient would benefit from: Skilled Rehab Services    Planned therapy interventions include: Therapeutic exercise, Therapeutic activities, Neuromuscular re-education, and Manual therapy.    Planned modalities to include: Biofeedback, Cryotherapy (cold pack), Electrical  stimulation - attended, Electrical stimulation - passive/unattended, Thermotherapy (hot pack), and Other (Comment). Dry needling      Visit Frequency: 2 times Per Week for 12 Weeks.       This plan was discussed with Patient.   Discussion participants: Agreed Upon Plan of Care           The patient's spiritual, cultural, and educational needs were considered, and the patient is agreeable to the plan of care and goals.     Education  Education was done with Patient. The patient's learning style includes Demonstration, Listening, and Pictures/video. The patient Demonstrates understanding and Verbalizes understanding.         Patient educated on POC, goals, HEP, and consents to treatment       Goals:   Active       Functional outcome       Patient will show a significant change in FOTO patient-reported outcome tool to demonstrate subjective improvement       Start:  06/28/25    Expected End:  12/31/25            Patient will demonstrate independence in home program for support of progression       Start:  06/28/25    Expected End:  12/31/25               Pain       Patient will report pain of 2/10 demonstrating a reduction of overall pain       Start:  06/28/25    Expected End:  12/31/25               Range of Motion       Patient will achieve left shoulder flexion AROM of 170 degrees       Start:  06/28/25    Expected End:  12/31/25            Patient will achieve left shoulder abduction AROM of 170 degrees       Start:  06/28/25    Expected End:  12/31/25            Patient will achieve left shoulder internal rotation PROM of 95 degrees in 90 degrees abd       Start:  06/28/25    Expected End:  12/31/25               Strength       Patient will achieve left shoulder flexion strength of 5/5       Start:  06/28/25    Expected End:  12/31/25            Patient will achieve left shoulder abduction strength of 5/5       Start:  06/28/25    Expected End:  12/31/25            Patient will achieve left shoulder external  rotation strength of 5/5 in 90 degrees abd       Start:  06/28/25    Expected End:  12/31/25            Patient will achieve left shoulder internal rotation strength of 5/5 in 90 degrees abd       Start:  06/28/25    Expected End:  12/31/25                Lior Diaz PT, ANABELLT

## 2025-06-28 PROBLEM — R53.1 WEAKNESS: Status: ACTIVE | Noted: 2025-06-28

## 2025-06-28 PROBLEM — M62.89 MUSCLE TIGHTNESS: Status: ACTIVE | Noted: 2025-06-28

## 2025-07-16 ENCOUNTER — OFFICE VISIT (OUTPATIENT)
Dept: PRIMARY CARE CLINIC | Facility: CLINIC | Age: 60
End: 2025-07-16
Payer: COMMERCIAL

## 2025-07-16 VITALS
HEART RATE: 74 BPM | BODY MASS INDEX: 26.98 KG/M2 | HEIGHT: 61 IN | SYSTOLIC BLOOD PRESSURE: 119 MMHG | OXYGEN SATURATION: 100 % | DIASTOLIC BLOOD PRESSURE: 73 MMHG | WEIGHT: 142.88 LBS

## 2025-07-16 DIAGNOSIS — M54.2 CERVICALGIA: ICD-10-CM

## 2025-07-16 DIAGNOSIS — R20.0 NUMBNESS AND TINGLING IN LEFT ARM: ICD-10-CM

## 2025-07-16 DIAGNOSIS — M25.512 TRIGGER POINT OF LEFT SHOULDER REGION: ICD-10-CM

## 2025-07-16 DIAGNOSIS — G89.29 CHRONIC LEFT SHOULDER PAIN: Primary | ICD-10-CM

## 2025-07-16 DIAGNOSIS — M25.512 CHRONIC LEFT SHOULDER PAIN: Primary | ICD-10-CM

## 2025-07-16 DIAGNOSIS — R20.2 NUMBNESS AND TINGLING IN LEFT ARM: ICD-10-CM

## 2025-07-16 PROBLEM — Z00.01 ENCOUNTER FOR GENERAL ADULT MEDICAL EXAMINATION WITH ABNORMAL FINDINGS: Status: RESOLVED | Noted: 2025-05-26 | Resolved: 2025-07-16

## 2025-07-16 PROCEDURE — 96372 THER/PROPH/DIAG INJ SC/IM: CPT | Mod: S$GLB,,,

## 2025-07-16 PROCEDURE — 3044F HG A1C LEVEL LT 7.0%: CPT | Mod: CPTII,S$GLB,,

## 2025-07-16 PROCEDURE — 3078F DIAST BP <80 MM HG: CPT | Mod: CPTII,S$GLB,,

## 2025-07-16 PROCEDURE — 99999 PR PBB SHADOW E&M-EST. PATIENT-LVL III: CPT | Mod: PBBFAC,,,

## 2025-07-16 PROCEDURE — 3074F SYST BP LT 130 MM HG: CPT | Mod: CPTII,S$GLB,,

## 2025-07-16 PROCEDURE — 99214 OFFICE O/P EST MOD 30 MIN: CPT | Mod: 25,S$GLB,,

## 2025-07-16 PROCEDURE — 3008F BODY MASS INDEX DOCD: CPT | Mod: CPTII,S$GLB,,

## 2025-07-16 PROCEDURE — 1159F MED LIST DOCD IN RCRD: CPT | Mod: CPTII,S$GLB,,

## 2025-07-16 RX ORDER — KETOROLAC TROMETHAMINE 30 MG/ML
30 INJECTION, SOLUTION INTRAMUSCULAR; INTRAVENOUS
Status: COMPLETED | OUTPATIENT
Start: 2025-07-16 | End: 2025-07-16

## 2025-07-16 RX ADMIN — KETOROLAC TROMETHAMINE 30 MG: 30 INJECTION, SOLUTION INTRAMUSCULAR; INTRAVENOUS at 11:07

## 2025-07-16 NOTE — PROGRESS NOTES
"INTERNAL MEDICINE  [facility]  [place]    Reason for visit:   Chief Complaint   Patient presents with    Shoulder Pain    Arm Pain     HPI: Cristina Fragoso is a 59 y.o. female   - with hyperlipidemia and depression presenting today for follow-up left shoulder and arm pain.    Patient is an established patient of PCP, Dr. Fannie Clarke DO. This patient is new to me.    Prior Note 05/26/2025  "Cristina Fragoso reports on and off tingling left arm that is exacerbated by lying down. She reports that she has seen order and a shoulder Xray is scheduled for today. Not constant. Not related with activity. No weakness."    Narrative & Impression  EXAMINATION:  XR SHOULDER TRAUMA 3 VIEW LEFT     CLINICAL HISTORY:  Pain, unspecified     TECHNIQUE:  Three views of the left shoulder were performed.     COMPARISON  None     FINDINGS:  No acute fracture or malalignment.  Preserved glenohumeral articulation.  Spurring about both sides of the acromioclavicular articulation.  No findings of calcific tendinitis.     Impression:     As above        Electronically signed by:Bharat Gilliland  Date:                                            05/26/2025  Time:                                           13:27    She was evaluated by orthopedics who suggested physical therapy with follow-up to reassess progress. She has since completed one session of physical therapy.    Today 07/16/2025 Mrs. Cristina Fragoso reports  left neck/shoulder and left arm pain. The pain is severe and becoming debilitating, radiating from the shoulder to the left neck area. She experiences intermittent numbness and tingling sensations in her left arm extending to her left hand. She reports reduced range of motion and weakness in the left arm, preventing her from carrying objects and substantially limiting her normal functional capabilities. She is currently managing pain with frequent use of ibuprofen and lidocaine patches. She previously attempted " "Tiger Balm patch but discontinued due to skin burning sensation.    She was evaluated by her PCP and subsequently by an orthopedist. She has initiated physical therapy and reports the prescribed exercises have been helpful however she still experiences severe pain.    Shoulder x-ray showed arthritis changes with bone spurs (osteophytes), without evidence of fracture or dislocation.    She has an allergy to acetaminophen.        Review of Systems   Musculoskeletal:  Positive for joint pain and neck pain.   All other systems reviewed and are negative.      Social History     Social History Narrative     since 12/2024 ([place]  10 years). From [facility].     Lives with her daughter and granddaughter. 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 1 Tablet Oral Twice a day    Taking    FLUoxetine 20 MG capsule Take by mouth. 1 Capsule Oral Twice a day    Taking    rosuvastatin (CRESTOR) 10 MG tablet Take 1 tablet (10 mg total) by mouth once daily. 90 tablet 1 Taking       Vital signs:   Vitals:    07/16/25 1124   BP: 119/73   BP Location: Right arm   Patient Position: Sitting   Pulse: 74   SpO2: 100%   Weight: 64.8 kg (142 lb 13.7 oz)   Height: 5' 1" (1.549 m)     Body mass index is 26.99 kg/m².    PHYSICAL EXAM:     Physical Exam  Vitals reviewed.   Constitutional:       General: She is not in acute distress.     Appearance: Normal appearance. She is not ill-appearing or diaphoretic.   HENT:      Head: Normocephalic and atraumatic.   Neck: "     Cardiovascular:      Rate and Rhythm: Normal rate.   Pulmonary:      Effort: Pulmonary effort is normal.   Musculoskeletal:      Left shoulder: Tenderness present. No swelling, deformity or effusion. Decreased range of motion (with lateral abduction).      Cervical back: Tenderness (left neck paraspinal) present. No swelling, deformity or spasms. Normal range of motion.        Back:       Comments: Tender trigger point along left trapezius reproducing LUE tingling sensation   Skin:     General: Skin is warm and dry.      Coloration: Skin is not pale.      Findings: No erythema or rash.   Neurological:      Mental Status: She is alert and oriented to person, place, and time.   Psychiatric:         Mood and Affect: Mood normal.         Behavior: Behavior normal.         Thought Content: Thought content normal.         Judgment: Judgment normal.           ASSESSMENT/PLAN:      1. Chronic left shoulder pain  -     ketorolac injection 30 mg    2. Numbness and tingling in left arm  Overview:  - discussed differential  - does not appear cardiac in nature since reproducible on exam  - mostly likely trigger point with or without cervical radiculopathy  - shoulder x-ray with degenerative changes but no acute abnormality  - began PT and recommend continue  - recommend follow-up with orthopedist due to worsening symptoms  - recommend cervical x-rays to determine if REFUGIO may help to resolve symptoms      3. Trigger point of left shoulder region  Overview:  - see numbness and tingling      4. Cervicalgia  -     ketorolac injection 30 mg  -     Cancel: X-Ray Cervical Spine Complete 5 view; Future; Expected date: 07/16/2025  -     X-Ray Cervical Spine Complete 5 view; Future; Expected date: 07/16/2025    Vaccines recommended: Tetanus, Shingles, and PCV-20    Follow up if symptoms worsen or fail to improve.     TESFAYE Prieto   Internal Medicine

## 2025-07-24 NOTE — PROGRESS NOTES
"  Outpatient Rehab    Physical Therapy Visit    Patient Name: Cristina Fragoso  MRN: [id number]  YOB: 1965  Encounter Date: 7/25/2025    Therapy Diagnosis:   Encounter Diagnoses   Name Primary?    Muscle tightness Yes    Weakness      Physician: Fannie Clarke DO    Physician Orders: Eval and Treat  Medical Diagnosis: Numbness and tingling in left arm  Trigger point of left shoulder region  Surgical Diagnosis: Not applicable for this Episode   Surgical Date: Not applicable for this Episode  Days Since Last Surgery: Not applicable for this Episode    Visit # / Visits Authorized:  1 / 20  Insurance Authorization Period: 6/27/2025 to 12/31/2025  Date of Evaluation: 6/27/2025  Plan of Care Certification: 6/28/2025 to 12/31/2025      PT/PTA:     Number of PTA visits since last PT visit:   Time In: 1000   Time Out: 1000  Total Time (in minutes): 0   Total Billable Time (in minutes):      FOTO:  Intake Score (%): 51.7  Survey Score 2 (%): Not applicable for this Episode  Survey Score 3 (%): 41    Precautions:         Subjective   She was a littel sore afetr that first visit but it wasn't too bad. She still has alot of pain in that left shoulder, which increases with activity.  Pain reported as 6/10.      Objective            Treatment:  Therapeutic Exercise  TE 1: Upper trap stretch 2x 30" hold  TE 2: Levator scap stretch 2x 30" hold  TE 3: Supine shoulder flexion 2# wand 2x10  TE 4: Supine chest press 2# 3x10  TE 5: Supine SA punch 2# 2x10  TE 6: Pulleys shoulder flexion 3'  Balance/Neuromuscular Re-Education  NMR 1: Seated scap retractions 2x10  NMR 2: SL shoulder abd 2x10  NMR 3: SL shoulder ER 2x10    Time Entry(in minutes):       Assessment & Plan   Assessment: Ms. Fragoso presented to PT today for her first visit following her initial eval, reporting continued high levels of left shoulder pain and stiffness.  Introduced exercises for improved left shoulder ROM and strength, which was challenging for Ms. " [place], but she was able to complete.  She required occasional verbal cues and demo's for proper form and mm activation, which she was able to carryover fairly well.  Following today's session, she reported decreased left shoulder stiffness/pain.  Will assess Ms. Fragoso's reponse to today's session and progress as tolerated.        The patient will continue to benefit from skilled outpatient physical therapy in order to address the deficits listed in the problem list on the initial evaluation, provide patient and family education, and maximize the patients level of independence in the home and community environments.     The patient's spiritual, cultural, and educational needs were considered, and the patient is agreeable to the plan of care and goals.           Plan: Improve left shoulder ROM and strength    Goals:   Active       Functional outcome       Patient will show a significant change in FOTO patient-reported outcome tool to demonstrate subjective improvement       Start:  06/28/25    Expected End:  12/31/25            Patient will demonstrate independence in home program for support of progression       Start:  06/28/25    Expected End:  12/31/25               Pain       Patient will report pain of 2/10 demonstrating a reduction of overall pain       Start:  06/28/25    Expected End:  12/31/25               Range of Motion       Patient will achieve left shoulder flexion AROM of 170 degrees       Start:  06/28/25    Expected End:  12/31/25            Patient will achieve left shoulder abduction AROM of 170 degrees       Start:  06/28/25    Expected End:  12/31/25            Patient will achieve left shoulder internal rotation PROM of 95 degrees in 90 degrees abd       Start:  06/28/25    Expected End:  12/31/25               Strength       Patient will achieve left shoulder flexion strength of 5/5       Start:  06/28/25    Expected End:  12/31/25            Patient will achieve left shoulder abduction  strength of 5/5       Start:  06/28/25    Expected End:  12/31/25            Patient will achieve left shoulder external rotation strength of 5/5 in 90 degrees abd       Start:  06/28/25    Expected End:  12/31/25            Patient will achieve left shoulder internal rotation strength of 5/5 in 90 degrees abd       Start:  06/28/25    Expected End:  12/31/25                Chrissy Ruiz PTA

## 2025-07-25 ENCOUNTER — CLINICAL SUPPORT (OUTPATIENT)
Dept: REHABILITATION | Facility: HOSPITAL | Age: 60
End: 2025-07-25
Payer: COMMERCIAL

## 2025-07-25 DIAGNOSIS — M62.89 MUSCLE TIGHTNESS: Primary | ICD-10-CM

## 2025-07-25 DIAGNOSIS — R53.1 WEAKNESS: ICD-10-CM

## 2025-07-25 PROCEDURE — 97110 THERAPEUTIC EXERCISES: CPT | Mod: PN,CQ

## 2025-07-25 PROCEDURE — 97112 NEUROMUSCULAR REEDUCATION: CPT | Mod: PN,CQ

## 2025-07-31 ENCOUNTER — HOSPITAL ENCOUNTER (OUTPATIENT)
Dept: RADIOLOGY | Facility: OTHER | Age: 60
Discharge: HOME | End: 2025-07-31
Payer: COMMERCIAL

## 2025-07-31 DIAGNOSIS — M54.2 CERVICALGIA: ICD-10-CM

## 2025-07-31 PROCEDURE — 72050 X-RAY EXAM NECK SPINE 4/5VWS: CPT | Mod: TC

## 2025-07-31 PROCEDURE — 72050 X-RAY EXAM NECK SPINE 4/5VWS: CPT | Mod: 26,,, | Performed by: RADIOLOGY

## 2025-08-04 ENCOUNTER — TELEPHONE (OUTPATIENT)
Dept: PRIMARY CARE CLINIC | Facility: CLINIC | Age: 60
End: 2025-08-04
Payer: COMMERCIAL

## 2025-08-04 NOTE — TELEPHONE ENCOUNTER
----- Message from TESFAYE Prieto sent at 8/4/2025  9:32 AM CDT -----  Please assist patient with scheduling pain management appointment. If any questions please let her know I left a comment on her x-ray results in portal (she does not receive notifications). TY!  ----- Message -----  From: Theresa Rad Results In  Sent: 8/1/2025   9:25 AM CDT  To: TESFAYE Kilpatrick

## 2025-08-04 NOTE — TELEPHONE ENCOUNTER
Attempted to contact pt 3 times inregards to scheduling appointment  . No answer was given out of all attempts. Left message on voicemail

## 2025-08-11 PROBLEM — R53.1 WEAKNESS: Status: RESOLVED | Noted: 2025-06-28 | Resolved: 2025-08-11

## 2025-08-14 ENCOUNTER — TELEPHONE (OUTPATIENT)
Dept: PAIN MEDICINE | Facility: CLINIC | Age: 60
End: 2025-08-14
Payer: COMMERCIAL

## 2025-08-14 ENCOUNTER — CLINICAL SUPPORT (OUTPATIENT)
Dept: REHABILITATION | Facility: HOSPITAL | Age: 60
End: 2025-08-14
Payer: COMMERCIAL

## 2025-08-14 DIAGNOSIS — M62.89 MUSCLE TIGHTNESS: Primary | ICD-10-CM

## 2025-08-14 PROCEDURE — 97112 NEUROMUSCULAR REEDUCATION: CPT | Mod: PN

## 2025-08-14 PROCEDURE — 97110 THERAPEUTIC EXERCISES: CPT | Mod: PN

## 2025-08-15 ENCOUNTER — OFFICE VISIT (OUTPATIENT)
Dept: PAIN MEDICINE | Facility: CLINIC | Age: 60
End: 2025-08-15
Payer: COMMERCIAL

## 2025-08-15 VITALS
DIASTOLIC BLOOD PRESSURE: 82 MMHG | HEIGHT: 61 IN | SYSTOLIC BLOOD PRESSURE: 117 MMHG | HEART RATE: 66 BPM | RESPIRATION RATE: 18 BRPM | TEMPERATURE: 97 F | WEIGHT: 142.88 LBS | OXYGEN SATURATION: 97 % | BODY MASS INDEX: 26.98 KG/M2

## 2025-08-15 DIAGNOSIS — M19.019 AC JOINT ARTHROPATHY: Primary | ICD-10-CM

## 2025-08-15 DIAGNOSIS — M75.42 SHOULDER IMPINGEMENT SYNDROME, LEFT: ICD-10-CM

## 2025-08-15 PROCEDURE — 99999 PR PBB SHADOW E&M-EST. PATIENT-LVL IV: CPT | Mod: PBBFAC,,, | Performed by: ANESTHESIOLOGY

## 2025-08-15 RX ORDER — METHOCARBAMOL 500 MG/1
500 TABLET, FILM COATED ORAL 2 TIMES DAILY PRN
Qty: 60 TABLET | Refills: 0 | Status: SHIPPED | OUTPATIENT
Start: 2025-08-15

## 2025-08-20 ENCOUNTER — CLINICAL SUPPORT (OUTPATIENT)
Dept: REHABILITATION | Facility: HOSPITAL | Age: 60
End: 2025-08-20
Payer: COMMERCIAL

## 2025-08-20 DIAGNOSIS — M62.89 MUSCLE TIGHTNESS: Primary | ICD-10-CM

## 2025-08-20 PROCEDURE — 97110 THERAPEUTIC EXERCISES: CPT | Mod: PN

## 2025-08-20 PROCEDURE — 97112 NEUROMUSCULAR REEDUCATION: CPT | Mod: PN

## 2025-08-26 ENCOUNTER — LAB VISIT (OUTPATIENT)
Dept: LAB | Facility: HOSPITAL | Age: 60
End: 2025-08-26
Attending: FAMILY MEDICINE
Payer: COMMERCIAL